# Patient Record
Sex: FEMALE | Race: OTHER | Employment: UNEMPLOYED | URBAN - METROPOLITAN AREA
[De-identification: names, ages, dates, MRNs, and addresses within clinical notes are randomized per-mention and may not be internally consistent; named-entity substitution may affect disease eponyms.]

---

## 2017-06-13 ENCOUNTER — OFFICE VISIT (OUTPATIENT)
Dept: OBGYN CLINIC | Age: 42
End: 2017-06-13

## 2017-06-13 ENCOUNTER — HOSPITAL ENCOUNTER (OUTPATIENT)
Dept: PERINATAL CARE | Age: 42
Discharge: HOME | End: 2017-06-13
Attending: OBSTETRICS & GYNECOLOGY
Payer: MEDICAID

## 2017-06-13 VITALS
SYSTOLIC BLOOD PRESSURE: 124 MMHG | WEIGHT: 170 LBS | HEIGHT: 65 IN | BODY MASS INDEX: 28.32 KG/M2 | DIASTOLIC BLOOD PRESSURE: 80 MMHG

## 2017-06-13 DIAGNOSIS — Z01.419 WELL FEMALE EXAM WITH ROUTINE GYNECOLOGICAL EXAM: ICD-10-CM

## 2017-06-13 DIAGNOSIS — N89.8 VAGINAL DISCHARGE DURING PREGNANCY IN FIRST TRIMESTER: ICD-10-CM

## 2017-06-13 DIAGNOSIS — Z32.01 PREGNANCY EXAMINATION OR TEST, POSITIVE RESULT: Primary | ICD-10-CM

## 2017-06-13 DIAGNOSIS — O26.891 VAGINAL DISCHARGE DURING PREGNANCY IN FIRST TRIMESTER: ICD-10-CM

## 2017-06-13 PROBLEM — Z34.00 SUPERVISION OF NORMAL FIRST PREGNANCY: Status: ACTIVE | Noted: 2017-06-13

## 2017-06-13 LAB
ANTIBODY SCREEN, EXTERNAL: NORMAL
CHLAMYDIA, EXTERNAL: NEGATIVE
CYSTIC FIBROSIS, EXTERNAL: NEGATIVE
HBSAG, EXTERNAL: NEGATIVE
HCT, EXTERNAL: 36.3
HGB, EXTERNAL: 12.2
HIV, EXTERNAL: NEGATIVE
N. GONORRHEA, EXTERNAL: NEGATIVE
PAP SMEAR, EXTERNAL: NORMAL
PLATELET CNT,   EXTERNAL: 363
RUBELLA, EXTERNAL: NORMAL
T. PALLIDUM, EXTERNAL: NEGATIVE
TYPE, ABO & RH, EXTERNAL: NORMAL
URINALYSIS, EXTERNAL: NEGATIVE

## 2017-06-13 PROCEDURE — 76801 OB US < 14 WKS SINGLE FETUS: CPT | Performed by: OBSTETRICS & GYNECOLOGY

## 2017-06-13 NOTE — PATIENT INSTRUCTIONS
Learning About Pregnancy  Your Care Instructions  Your health in the early weeks of your pregnancy is particularly important for your babys health. Take good care of yourself. Anything you do that harms your body can also harm your baby. Make sure to go to all of your doctor appointments. Regular checkups will help keep you and your baby healthy. Follow-up care is a key part of your treatment and safety. Be sure to make and go to all appointments, and call your doctor if you are having problems. Its also a good idea to know your test results and keep a list of the medicines you take. How can you care for yourself at home? Diet  · Eat a balanced diet. Make sure your diet includes plenty of beans, peas, and leafy green vegetables. · Do not skip meals or go for many hours without eating. If you are nauseated, try to eat a small, healthy snack every 2 to 3 hours. · Do not eat fish that has a high level of mercury, such as shark, swordfish, or mackerel. Do not eat more than one can of tuna each week. · Drink plenty of fluids, enough so that your urine is light yellow or clear like water. If you have kidney, heart, or liver disease and have to limit fluids, talk with your doctor before you increase the amount of fluids you drink. · Cut down on caffeine, such as coffee, tea, and cola. · Do not drink alcohol, such as beer, wine, or hard liquor. · Take a multivitamin that contains at least 400 micrograms (mcg) of folic acid to help prevent birth defects. Fortified cereal and whole wheat bread are good additional sources of folic acid. · Increase the calcium in your diet. Try to drink a quart of skim milk each day. You may also take calcium supplements and choose foods such as cheese and yogurt. Lifestyle  · Make sure you go to your follow-up appointments. · Get plenty of rest. You may be unusually tired while you are pregnant. · Get at least 30 minutes of exercise on most days of the week.  Walking is a good choice. If you have not exercised in the past, start out slowly. Take several short walks each day. · Do not smoke. If you need help quitting, talk to your doctor about stop-smoking programs. These can increase your chances of quitting for good. · Do not touch cat feces or litter boxes. Also, wash your hands after you handle raw meat, and fully cook all meat before you eat it. Wear gloves when you work in the yard or garden, and wash your hands well when you are done. Cat feces, raw or undercooked meat, and contaminated dirt can cause an infection that may harm your baby or lead to a miscarriage. · Do not use saunas or hot tubs. Raising your body temperature may harm your baby. · Avoid chemical fumes, paint fumes, or poisons. · Do not use illegal drugs or alcohol. Medicines  · Review all of your medicines with your doctor. Some of your routine medicines may need to be changed to protect your baby. · Use acetaminophen (Tylenol) to relieve minor problems, such as a mild headache or backache or a mild fever with cold symptoms. Do not use nonsteroidal anti-inflammatory drugs (NSAIDs), such as ibuprofen (Advil, Motrin) or naproxen (Aleve), unless your doctor says it is okay. · Do not take two or more pain medicines at the same time unless the doctor told you to. Many pain medicines have acetaminophen, which is Tylenol. Too much acetaminophen (Tylenol) can be harmful. · Take your medicines exactly as prescribed. Call your doctor if you think you are having a problem with your medicine. To manage morning sickness  · If you feel sick when you first wake up, try eating a small snack (such as crackers) before you get out of bed. Allow some time to digest the snack, and then get out of bed slowly. · Do not skip meals or go for long periods without eating. An empty stomach can make nausea worse. · Eat small, frequent meals instead of three large meals each day. · Drink plenty of fluids.  Sports drinks, such as Gatorade or Powerade, are good choices. · Eat foods that are high in protein but low in fat. · If you are taking iron supplements, ask your doctor if they are necessary. Iron can make nausea worse. · Avoid any smells, such as coffee, that make you feel sick. · Get lots of rest. Morning sickness may be worse when you are tired. Where can you learn more? Go to http://patti-juanito.info/. Enter P637 in the search box to learn more about \"Learning About Pregnancy. \"  Current as of: May 30, 2016  Content Version: 11.2  © 8240-6434 [employer], [employer]. Care instructions adapted under license by [employer] (which disclaims liability or warranty for this information). If you have questions about a medical condition or this instruction, always ask your healthcare professional. [address] any warranty or liability for your use of this information.

## 2017-06-13 NOTE — MR AVS SNAPSHOT
Visit Information Date & Time Provider Department Dept. Phone Encounter #  
 2017 10:10 AM Chris Mccarthy MD [facility] [phone] [id number] Upcoming Health Maintenance Date Due  
 PAP AKA CERVICAL CYTOLOGY 1995 INFLUENZA AGE 9 TO ADULT 2017 Allergies as of 2017  Review Complete On: 2017 By: Rosita Mccollumr No Known Allergies Current Immunizations  Never Reviewed No immunizations on file. Not reviewed this visit You Were Diagnosed With   
  
 Codes Comments Pregnancy examination or test, positive result    -  Primary ICD-10-CM: Z32.01 
ICD-9-CM: V72.42 Well female exam with routine gynecological exam     ICD-10-CM: S74.209 ICD-9-CM: V72.31 Vitals BP Height(growth percentile) Weight(growth percentile) LMP BMI OB Status 124/80 5' 5\" (1.651 m) 170 lb (77.1 kg) 2017 (Exact Date) 28.29 kg/m2 Pregnant Smoking Status Never Smoker BMI and BSA Data Body Mass Index Body Surface Area  
 28.29 kg/m 2 1.88 m 2 Your Updated Medication List  
  
Notice  As of 2017 10:46 AM  
 You have not been prescribed any medications. We Performed the Following ANTIBODY SCREEN [id number] CPT(R)] CBC W/O DIFF [21340 CPT(R)] CULTURE, URINE [id number] CPT(R)] CYSTIC FIBROSIS MUTATION 97 [[id number] Custom] HEMOGLOBIN FRACTIONATION [[id number] Custom] HEP B SURFACE AG [id number] CPT(R)] HIV 1/2 AG/AB, 4TH GENERATION,W RFLX CONFIRM [[id number] Custom] PAP IG, CT-NG TV HPV 16&18,45 ([id number], [id number]) [[id number] Custom] PARVOVIRUS B19 AB, IGG [40712 CPT(R)] PARVOVIRUS B19 AB, IGM [42414 CPT(R)] PLATELET [39248 CPT(R)] REFERRAL TO PERINATOLOGY [LBO51 Custom] Comments:  
 Requested date to be seen:  ASAP Estimated Date of Delivery: 17 Obstetric History   T0    TAB0  SAB2  E0  M0  L0 Name of Baby 1: Not recorded Date: Not recorded     GA: Not recorded     Delivery: Not recorded Apgar1: Not recorded     Apgar5: Not recorded Living:  
 
Name of Baby 2: Not recorded Date: Not recorded     GA: Not recorded     Delivery: Not recorded Apgar1: Not recorded     Apgar5: Not recorded Living:  
 
Name of Baby 3: Not recorded Date: Not recorded     GA: Not recorded     Delivery: Not recorded Apgar1: Not recorded     Apgar5: Not recorded Living:  
 
 
Clinical indication for services requested (r/o statements cannot be used): 
__ Abnormal Screening test                            __Oligohydramnios __ AMA                                                             __Polyhydramnios __Diabetes (overt or gestational)                    __Preeclampsia  
__Decreased fetal movement                         __Preterm Labor __Fibroid uterus                                              __PROM 
__Habitual abortions                                       __Screening for anomaly, no risk factors __Hypertension                                               __Twins or higher  
__Incompetent cervix                                      __Limited/no prenatal care 
__Obesity                                                        __Vaginal Bleeding 
__Abnormal US Finding                              __Family History __Maternal Medical Condition                     _X_Other AMA and abnormal ultrasound Primary Perinatology Service Requested (required, please check one): 
_X_Diagnostic Ultrasound Services (with consult, if indicated) __Medical Consultation (with ultrasound, if clinically indicated) __Medical consultation only (no ultrasound services requested) __Genetic counseling only (no ultrasound services requested) Additional Services requested (optional, please check any that apply): 
__Nuchal translucency screening (CRL, NT, FHR) __Fetal echo with doppler __Chorionic villous sampling: Blood Type __Cerclage placement __3-D US if appropriate for findings or history __Fetal well-being assessment (BPP with NST , without NST , with Doppler studies if indicated) __PTD screen (TVUS-FFN @ 22-24 wk) __Cervix insufficiency screen starting at  wks (biweekly cervical length by TVUS) __Gestational diabetes management __Amniocentesis: Blood type:  
__External version GYN 
__Other procedure, please specify:  
 RUBELLA AB, IGG [id number] CPT(R)] T PALLIDUM SCREEN W/REFLEX [[id number] Custom] TYPE, ABO & RH [16162 CPT(R)] Referral Information Referral ID Referred By Referred To  
  
 [id number] Glory Gómez [facility]   
   [address] [facility] [address] [facility], [address] [facility] Phone: [phone] Visits Status Start Date End Date 1 New Request 17 If your referral has a status of pending review or denied, additional information will be sent to support the outcome of this decision. Patient Instructions Learning About Pregnancy Your Care Instructions Your health in the early weeks of your pregnancy is particularly important for your babys health. Take good care of yourself. Anything you do that harms your body can also harm your baby. Make sure to go to all of your doctor appointments. Regular checkups will help keep you and your baby healthy. Follow-up care is a key part of your treatment and safety. Be sure to make and go to all appointments, and call your doctor if you are having problems. Its also a good idea to know your test results and keep a list of the medicines you take. How can you care for yourself at home? Diet · Eat a balanced diet. Make sure your diet includes plenty of beans, peas, and leafy green vegetables. · Do not skip meals or go for many hours without eating. If you are nauseated, try to eat a small, healthy snack every 2 to 3 hours.  
· Do not eat fish that has a high level of mercury, such as shark, swordfish, or mackerel. Do not eat more than one can of tuna each week. · Drink plenty of fluids, enough so that your urine is light yellow or clear like water. If you have kidney, heart, or liver disease and have to limit fluids, talk with your doctor before you increase the amount of fluids you drink. · Cut down on caffeine, such as coffee, tea, and cola. · Do not drink alcohol, such as beer, wine, or hard liquor. · Take a multivitamin that contains at least 400 micrograms (mcg) of folic acid to help prevent birth defects. Fortified cereal and whole wheat bread are good additional sources of folic acid. · Increase the calcium in your diet. Try to drink a quart of skim milk each day. You may also take calcium supplements and choose foods such as cheese and yogurt. Lifestyle · Make sure you go to your follow-up appointments. · Get plenty of rest. You may be unusually tired while you are pregnant. · Get at least 30 minutes of exercise on most days of the week. Walking is a good choice. If you have not exercised in the past, start out slowly. Take several short walks each day. · Do not smoke. If you need help quitting, talk to your doctor about stop-smoking programs. These can increase your chances of quitting for good. · Do not touch cat feces or litter boxes. Also, wash your hands after you handle raw meat, and fully cook all meat before you eat it. Wear gloves when you work in the yard or garden, and wash your hands well when you are done. Cat feces, raw or undercooked meat, and contaminated dirt can cause an infection that may harm your baby or lead to a miscarriage. · Do not use saunas or hot tubs. Raising your body temperature may harm your baby. · Avoid chemical fumes, paint fumes, or poisons. · Do not use illegal drugs or alcohol. Medicines · Review all of your medicines with your doctor. Some of your routine medicines may need to be changed to protect your baby. · Use acetaminophen (Tylenol) to relieve minor problems, such as a mild headache or backache or a mild fever with cold symptoms. Do not use nonsteroidal anti-inflammatory drugs (NSAIDs), such as ibuprofen (Advil, Motrin) or naproxen (Aleve), unless your doctor says it is okay. · Do not take two or more pain medicines at the same time unless the doctor told you to. Many pain medicines have acetaminophen, which is Tylenol. Too much acetaminophen (Tylenol) can be harmful. · Take your medicines exactly as prescribed. Call your doctor if you think you are having a problem with your medicine. To manage morning sickness · If you feel sick when you first wake up, try eating a small snack (such as crackers) before you get out of bed. Allow some time to digest the snack, and then get out of bed slowly. · Do not skip meals or go for long periods without eating. An empty stomach can make nausea worse. · Eat small, frequent meals instead of three large meals each day. · Drink plenty of fluids. Sports drinks, such as Gatorade or Powerade, are good choices. · Eat foods that are high in protein but low in fat. · If you are taking iron supplements, ask your doctor if they are necessary. Iron can make nausea worse. · Avoid any smells, such as coffee, that make you feel sick. · Get lots of rest. Morning sickness may be worse when you are tired. Where can you learn more? Go to http://patti-juanito.info/. Enter H701 in the search box to learn more about \"Learning About Pregnancy. \" Current as of: May 30, 2016 Content Version: 11.2 © 5588-6610 [employer]. Care instructions adapted under license by [employer] (which disclaims liability or warranty for this information). If you have questions about a medical condition or this instruction, always ask your healthcare professional. [address] any warranty or liability for your use of this information. Introducing [facility]! [facility] introduces [employer] patient portal. Now you can access parts of your medical record, email your doctor's office, and request medication refills online. 1. In your internet browser, go to [email or web address] 2. Click on the First Time User? Click Here link in the Sign In box. You will see the New Member Sign Up page. 3. Enter your [employer] Access Code exactly as it appears below. You will not need to use this code after youve completed the sign-up process. If you do not sign up before the expiration date, you must request a new code. · [employer] Access Code: [email or web address] Expires: 9/11/2017  9:32 AM 
 
4. Enter the last four digits of your Social Security Number (xxxx) and Date of Birth (mm/dd/yyyy) as indicated and click Submit. You will be taken to the next sign-up page. 5. Create a [employer] ID. This will be your [employer] login ID and cannot be changed, so think of one that is secure and easy to remember. 6. Create a [employer] password. You can change your password at any time. 7. Enter your Password Reset Question and Answer. This can be used at a later time if you forget your password. 8. Enter your e-mail address. You will receive e-mail notification when new information is available in [address] 9. Click Sign Up. You can now view and download portions of your medical record. 10. Click the Download Summary menu link to download a portable copy of your medical information. If you have questions, please visit the Frequently Asked Questions section of the [employer] website. Remember, [employer] is NOT to be used for urgent needs. For medical emergencies, dial 911. Now available from your iPhone and Android! Please provide this summary of care documentation to your next provider. If you have any questions after today's visit, please call [phone].

## 2017-06-13 NOTE — PROGRESS NOTES
Current pregnancy history:    Tim Gonzalez is a ,  36 y.o. female  Patient's last menstrual period was 2017 (exact date). .  She presents for the evaluation of amenorrhea and a positive pregnancy test.    LMP history:  The date of her LMP is certain. Her last menstrual period was normal and lasted for 4 to 5 days. A urine pregnancy test was positive a few weeks ago. She was not on the pill at conception. Based on her LMP, her EDC is 17 and her EGA is 11 weeks,4 days. Her menstrual cycles are regular and occur approximately every 28 days  and range from 3 to 5 days. The last menses lasted the usual number of days. Ultrasound data:  She had an  ultrasound done by the ultrasound tech today which revealed a viable hurst pregnancy with a gestational age of 9 weeks and 4 days giving an EDC of 18. Pregnancy symptoms:    Since her LMP she has experienced  urinary frequency, breast tenderness, and nausea. She has been vomiting over the last few weeks. Associated signs and symptoms which she denies: dysuria, discharge, vaginal bleeding. Relevant past pregnancy history:   She has the following pregnancy history: last pregnancy ended in miscarriage    Relevant past medical history:(relevant to this pregnancy): noncontributory. Substance history: negative for alcohol, tobacco and street drugs. Exposure history: There is/are no indoor cat/s in the home. She admits close contact with children on a regular basis. She has had chicken pox or the vaccine in the past.   Patient denies issues with domestic violence. Genetic Screening/Teratology Counseling: (Includes patient, baby's father, or anyone in either family with:)  3.  Patient's age >/= 28 at [facility]?-- no  .   2.   Thalassemia ([facility], [facility], [address], or  background): MCV<80?--no.     3.  Neural tube defect (meningomyelocele, spina bifida, anencephaly)?--no.   4.  Congenital heart defect?--no.  5.  Down syndrome?--no.   6.  Rocky-Sachs ([employer], [place])?--no.   7.  Canavan's Disease?--no.   8.  Familial Dysautonomia?--no.   9.  Sickle cell disease or trait ()? --no   The patient has not been tested for sickle trait  10. Hemophilia or other blood disorders?--no. 11.  Muscular dystrophy?--no. 12.  Cystic fibrosis?--no. 13.  Forrest's Chorea?--no. 14.  Mental retardation/autism (if yes was person tested for Fragile X)?--no. 15.  Other inherited genetic or chromosomal disorder?--no. 12.  Maternal metabolic disorder (DM, PKU, etc)?--no. 17.  Patient or FOB with a child with a birth defect not listed above?--no.  17a. Patient or FOB with a birth defect themselves?--no. 18.  Recurrent pregnancy loss, or stillbirth?--no. 19.  Any medications since LMP other than prenatal vitamins (include vitamins, supplements, OTC meds, drugs, alcohol)?--no. 20.  Any other genetic/environmental exposure to discuss?--no. Infection History:  1. Lives with someone with TB or TB exposed?--no.   2.  Patient or partner has history of genital herpes?--no.  3.  Rash or viral illness since LMP?--no.    4.  History of STD (GC, CT, HPV, syphilis, HIV)? --no   5. Other: OTHER? Past Medical History:   Diagnosis Date    Arrhythmia      No past surgical history on file. Social History     Occupational History    Not on file.      Social History Main Topics    Smoking status: Never Smoker    Smokeless tobacco: Not on file    Alcohol use No    Drug use: No    Sexual activity: Yes     Partners: Male     Birth control/ protection: None     Family History   Problem Relation Age of Onset    No Known Problems Mother      OB History    Para Term  AB SAB TAB Ectopic Multiple Living   3 0   2 2    0      # Outcome Date GA Lbr Dillon/2nd Weight Sex Delivery Anes PTL Lv   3 Current            2 SAB            1 SAB                 No Known Allergies  Prior to Admission medications    Not on File Review of Systems: History obtained from the patient  Constitutional: negative for weight loss, fever, night sweats  HEENT: negative for hearing loss, earache, congestion, snoring, sore throat  CV: negative for chest pain, palpitations, edema  Resp: negative for cough, shortness of breath, wheezing  Breast: negative for breast lumps, nipple discharge, galactorrhea  GI: negative for change in bowel habits, abdominal pain, black or bloody stools  : negative for frequency, dysuria, hematuria, vaginal discharge  MSK: negative for back pain, joint pain, muscle pain  Skin: negative for itching, rash, hives  Neuro: negative for dizziness, headache, confusion, weakness  Psych: negative for anxiety, depression, change in mood  Heme/lymph: negative for bleeding, bruising, pallor    Objective:  Visit Vitals    /80    Ht 5' 5\" (1.651 m)    Wt 170 lb (77.1 kg)    LMP 04/07/2017 (Exact Date)    BMI 28.29 kg/m2       Physical Exam:     Constitutional  · Appearance: well-nourished, well developed, alert, in no acute distress    HENT  · Head  · Face: appears normal  · Eyes: appear normal  · Ears: normal  · Mouth: normal  · Lips: no lesions    Neck  · Inspection/Palpation: normal appearance, no masses or tenderness  · Lymph Nodes: no lymphadenopathy present  · Thyroid: gland size normal, nontender, no nodules or masses present on palpation    Chest  · Respiratory Effort: breathing unlabored    Gastrointestinal  · Abdominal Examination: abdomen non-tender to palpation, normal bowel sounds, no masses present  · Liver and spleen: no hepatomegaly present, spleen not palpable  · Hernias: no hernias identified    Genitourinary  · External Genitalia: normal appearance for age, no discharge present, no tenderness present, no inflammatory lesions present, no masses present, no atrophy present  · Vagina: normal vaginal vault without central or paravaginal defects, no discharge present, no inflammatory lesions present, no masses present  · Bladder: non-tender to palpation  · Urethra: appears normal  · Cervix: normal   · Uterus: enlarged, normal shape, soft  · Adnexa: no adnexal tenderness present, no adnexal masses present  · Perineum: perineum within normal limits, no evidence of trauma, no rashes or skin lesions present  · Anus: anus within normal limits, no hemorrhoids present  · Inguinal Lymph Nodes: no lymphadenopathy present    Skin  · General Inspection: no rash, no lesions identified    Neurologic/Psychiatric  · Mental Status:  · Orientation: grossly oriented to person, place and time  · Mood and Affect: mood normal, affect appropriate    Assessment:   Intrauterine pregnancy with the following problems identified: AMA- saw [facility] today, NIPS today. Initial concern re: cranial structure sent to [facility] and they were not concerned at this time. Plan:     Offered CF testing, CVS, Nuchal Translucency, MSAFP, amnio, and discussed NIPT  Course of pregnancy discussed including visit schedule, routine U/S, glucola testing, etc.  Avoid alcoholic beverages and illicit/recreational drugs use  Take prenatal vitamins or folic acid daily. Hospital and practice style discussed with coverage system. Discussed nutrition, toxoplasmosis precautions, sexual activity, exercise, need for influenza vaccine, environmental and work hazards, travel advice, screen for domestic violence, need for seat belts. Discussed seafood, unpasteurized dairy products, deli meat, artificial sweeteners, and caffeine. Information on prenatal classes/breastfeeding given. Information on circumcision given  Patient encouraged not to smoke. Discussed current prescription drug use. Given medication list.  Discussed the use of over the counter medications and chemicals. Route of delivery discussed, including risks, benefits, and alternatives of  versus repeat LTCS.   Pt understands risk of hemorrhage during pregnancy and post delivery and would accept blood products if necessary in life-threatening emergencies    Handouts given to pt.

## 2017-06-14 LAB — BACTERIA UR CULT: NO GROWTH

## 2017-06-15 LAB
C TRACH RRNA CVX QL NAA+PROBE: NEGATIVE
CYTOLOGIST CVX/VAG CYTO: NORMAL
CYTOLOGY CVX/VAG DOC THIN PREP: NORMAL
DX ICD CODE: NORMAL
HPV I/H RISK 1 DNA CVX QL PROBE+SIG AMP: NEGATIVE
Lab: NORMAL
N GONORRHOEA RRNA CVX QL NAA+PROBE: NEGATIVE
OTHER STN SPEC: NORMAL
PATH REPORT.FINAL DX SPEC: NORMAL
STAT OF ADQ CVX/VAG CYTO-IMP: NORMAL
T VAGINALIS RRNA SPEC QL NAA+PROBE: NEGATIVE

## 2017-06-17 LAB
A VAGINAE DNA VAG QL NAA+PROBE: ABNORMAL SCORE
BVAB2 DNA VAG QL NAA+PROBE: ABNORMAL SCORE
C ALBICANS DNA VAG QL NAA+PROBE: NEGATIVE
C GLABRATA DNA VAG QL NAA+PROBE: NEGATIVE
MEGA1 DNA VAG QL NAA+PROBE: ABNORMAL SCORE

## 2017-06-20 ENCOUNTER — TELEPHONE (OUTPATIENT)
Dept: OBGYN CLINIC | Age: 42
End: 2017-06-20

## 2017-06-20 LAB
ABO GROUP BLD: NORMAL
B19V IGG SER IA-ACNC: 0.6 INDEX (ref 0–0.8)
B19V IGM SER IA-ACNC: 0.1 INDEX (ref 0–0.8)
BLD GP AB SCN SERPL QL: NORMAL
BLD GP AB SCN SERPL QL: POSITIVE
BLOOD GROUP ANTIBODIES SERPL: ABNORMAL
CFTR MUT ANL BLD/T: NORMAL
ERYTHROCYTE [DISTWIDTH] IN BLOOD BY AUTOMATED COUNT: 13.9 % (ref 12.3–15.4)
GENE DIS ANL CARRIER INTERP-IMP: NORMAL
HBV SURFACE AG SERPL QL IA: NEGATIVE
HCT VFR BLD AUTO: 36.3 % (ref 34–46.6)
HGB A MFR BLD: 97.3 % (ref 94–98)
HGB A2 MFR BLD COLUMN CHROM: 2.7 % (ref 0.7–3.1)
HGB BLD-MCNC: 12.2 G/DL (ref 11.1–15.9)
HGB C MFR BLD: 0 %
HGB F MFR BLD: 0 % (ref 0–2)
HGB FRACT BLD-IMP: NORMAL
HGB S BLD QL SOLY: NEGATIVE
HGB S MFR BLD: 0 %
HIV 1+2 AB+HIV1 P24 AG SERPL QL IA: NON REACTIVE
MCH RBC QN AUTO: 29.1 PG (ref 26.6–33)
MCHC RBC AUTO-ENTMCNC: 33.6 G/DL (ref 31.5–35.7)
MCV RBC AUTO: 87 FL (ref 79–97)
PLATELET # BLD AUTO: 326 X10E3/UL (ref 150–379)
RBC # BLD AUTO: 4.19 X10E6/UL (ref 3.77–5.28)
RH BLD: NEGATIVE
RUBV IGG SERPL IA-ACNC: 1.43 INDEX
T PALLIDUM AB SER QL IA: NEGATIVE
WBC # BLD AUTO: 13.4 X10E3/UL (ref 3.4–10.8)
XXX BLOOD GROUP AB TITR SERPL AHG: ABNORMAL {TITER}

## 2017-06-20 RX ORDER — METRONIDAZOLE 500 MG/1
500 TABLET ORAL 2 TIMES DAILY
Qty: 14 TAB | Refills: 0 | Status: SHIPPED | OUTPATIENT
Start: 2017-06-20 | End: 2017-06-27

## 2017-06-20 NOTE — PROGRESS NOTES
Nabor Ibarra calling for lab results for patient. HIPPA verified for all access and given lab results over phone. Eugenia Avilez aware of MD recommendations and rx for flagyl will be sent to pharmacy of choice. Rushford Chute did mention that at the beginning of the patient's pregnancy, she was seen in the [facility] ER and was given Aida Bernheim does have those records and will be bringing in those records at next office visit. Eugenia Avilez aware that I will send the message to the physician alerting her that the patient previously had   Coleen Bernheim verbalized understanding.

## 2017-06-20 NOTE — TELEPHONE ENCOUNTER
Result Notes   Notes Recorded by Herber Little MD on 6/20/2017 at 1:38 PM  add to prenatal labs, Positive ant D titre, please ask if she has received Rhogam during the pregnancy.  If not then repeat in 4 weeks. Notes Recorded by Herber Little MD on 6/19/2017 at 11:04 AM  Results abnormal, notify pt of results,  labs c/w bv can take flagyl 500 mg 1 po bid x 1 week, do not combine with alcohol, prescription pended but no pharmacy in the computer. Candace Bee

## 2017-06-20 NOTE — PROGRESS NOTES
Chris Pizarro calling for lab results for patient. HIPPA verified for all access and given lab results over phone. Parke Claude aware of MD recommendations and rx for flagyl will be sent to pharmacy of choice. Parke Claude did mention that at the beginning of the patient's pregnancy, she was seen in the [facility] ER and was given Christina Suazo does have those records and will be bringing in those records at next office visit. Parke Claude aware that I will send the message to the physician alerting her that the patient previously had   Christina Jury verbalized understanding.

## 2017-06-20 NOTE — TELEPHONE ENCOUNTER
Rosanne Rodriguez calling for lab results for patient. HIPPA verified for all access and given lab results over phone. Sree Strange aware of MD recommendations and rx for flagyl will be sent to pharmacy of choice. Sree Strange did mention that at the beginning of the patient's pregnancy, she was seen in the [facility] ER and was given Kolleen Peabody does have those records and will be bringing in those records at next office visit. Sree Strange aware that I will send the message to the physician alerting her that the patient previously had   Kolleen Peabody verbalized understanding.

## 2017-06-21 RX ORDER — METRONIDAZOLE 500 MG/1
500 TABLET ORAL 2 TIMES DAILY
Qty: 14 TAB | Refills: 0 | Status: SHIPPED | OUTPATIENT
Start: 2017-06-21 | End: 2017-06-28

## 2017-06-22 ENCOUNTER — TELEPHONE (OUTPATIENT)
Dept: OBGYN CLINIC | Age: 42
End: 2017-06-22

## 2017-06-22 NOTE — TELEPHONE ENCOUNTER
Patient aware that not enough cells to test and opted to wait until her next fob appointment with FW to get blood drawn. Patient aware that she will not be charged for the repeat blood draw.

## 2017-06-22 NOTE — TELEPHONE ENCOUNTER
nips  Received: Yesterday       MD Sofy Duque                     Please call Janet Salmeron was no result reported, we can probably repeat it in 2 weeks.  I wanted to see if the patient will be billed for both tests.        LMTCB-- wanting to notify pt that there was no result because they didn't have enough cells to run the lab- we can repeat it in 2 weeks per FW-- Fernanda said the pt would not be billed for both since this was their error and not ours

## 2017-06-26 ENCOUNTER — HOSPITAL ENCOUNTER (OUTPATIENT)
Dept: PERINATAL CARE | Age: 42
Discharge: HOME | End: 2017-06-26
Attending: OBSTETRICS & GYNECOLOGY
Payer: MEDICAID

## 2017-06-26 PROCEDURE — 76815 OB US LIMITED FETUS(S): CPT | Performed by: OBSTETRICS & GYNECOLOGY

## 2017-06-28 ENCOUNTER — LAB ONLY (OUTPATIENT)
Dept: OBGYN CLINIC | Age: 42
End: 2017-06-28

## 2017-06-28 DIAGNOSIS — Z13.79 GENETIC TESTING: Primary | ICD-10-CM

## 2017-07-10 ENCOUNTER — TELEPHONE (OUTPATIENT)
Dept: OBGYN CLINIC | Age: 42
End: 2017-07-10

## 2017-07-10 NOTE — TELEPHONE ENCOUNTER
Kailee Gustafson(verified on HIPPA) called wanting the results from the 6/23/17. Patient is aware that the MD is out of the office but would like for this nurse to send it to the work in MD for review. Please advise.

## 2017-07-10 NOTE — TELEPHONE ENCOUNTER
It appears to be low risk but there are still parts that are not conclusive. I would advise she contact [facility], to discuss the new results in more detail, and see if they recommend considering amnio. 15w3d   36 y.o.

## 2017-07-11 NOTE — TELEPHONE ENCOUNTER
Notified pt with work in MD recommendation. She verbalized understanding and asked for the sex of the baby(female) and was given the MFM #.

## 2017-07-11 NOTE — TELEPHONE ENCOUNTER
Patient calling back to confirm her results and confirm the gender of her baby. Patient given TP recommendations.

## 2017-07-13 ENCOUNTER — ROUTINE PRENATAL (OUTPATIENT)
Dept: OBGYN CLINIC | Age: 42
End: 2017-07-13

## 2017-07-13 VITALS — BODY MASS INDEX: 28.79 KG/M2 | SYSTOLIC BLOOD PRESSURE: 124 MMHG | DIASTOLIC BLOOD PRESSURE: 72 MMHG | WEIGHT: 173 LBS

## 2017-07-13 DIAGNOSIS — Z34.02 ENCOUNTER FOR SUPERVISION OF NORMAL FIRST PREGNANCY IN SECOND TRIMESTER: Primary | ICD-10-CM

## 2017-07-13 LAB — AFP, MATERNAL, EXTERNAL: NORMAL

## 2017-07-13 NOTE — PROGRESS NOTES
Pt doing well, see prenatal flowsheet. msafp today  Discussed results of NIPS including the \"no result\" for Henning's Syndrome. Has f/u schedule will PNC.     Needs early glucola due to [facility]

## 2017-07-19 LAB
AFP ADJ MOM SERPL: 1.11
AFP INTERP SERPL-IMP: NORMAL
AFP INTERP SERPL-IMP: NORMAL
AFP SERPL-MCNC: 28.3 NG/ML
AGE AT DELIVERY: 43.9 YEARS
COMMENT, 01827: NORMAL
GA METHOD: NORMAL
GA: 15 WEEKS
IDDM PATIENT QL: NO
Lab: NORMAL
MULTIPLE PREGNANCY: NO
NEURAL TUBE DEFECT RISK FETUS: 8647 %
RESULTS, 017004: NORMAL

## 2017-08-10 ENCOUNTER — ROUTINE PRENATAL (OUTPATIENT)
Dept: OBGYN CLINIC | Age: 42
End: 2017-08-10

## 2017-08-10 VITALS
DIASTOLIC BLOOD PRESSURE: 72 MMHG | BODY MASS INDEX: 29.66 KG/M2 | RESPIRATION RATE: 19 BRPM | SYSTOLIC BLOOD PRESSURE: 120 MMHG | WEIGHT: 178 LBS | HEIGHT: 65 IN

## 2017-08-10 DIAGNOSIS — Z34.02 ENCOUNTER FOR SUPERVISION OF NORMAL FIRST PREGNANCY IN SECOND TRIMESTER: ICD-10-CM

## 2017-08-10 DIAGNOSIS — Z3A.19 19 WEEKS GESTATION OF PREGNANCY: ICD-10-CM

## 2017-08-10 DIAGNOSIS — O09.522 AMA (ADVANCED MATERNAL AGE) MULTIGRAVIDA 35+, SECOND TRIMESTER: Primary | ICD-10-CM

## 2017-08-10 NOTE — PROGRESS NOTES
Pt doing well, see prenatal flowsheet. Early glucola today  [facility] appointment tomorrow.

## 2017-08-11 LAB — GLUCOSE 1H P 50 G GLC PO SERPL-MCNC: 122 MG/DL (ref 65–139)

## 2017-08-15 ENCOUNTER — TELEPHONE (OUTPATIENT)
Dept: OBGYN CLINIC | Age: 42
End: 2017-08-15

## 2017-08-15 NOTE — TELEPHONE ENCOUNTER
Patient calling stating that she would like a generic PNV called into her pharmacy. Patient aware that rx was sent.

## 2017-08-18 ENCOUNTER — HOSPITAL ENCOUNTER (OUTPATIENT)
Dept: PERINATAL CARE | Age: 42
Discharge: HOME | End: 2017-08-18
Attending: OBSTETRICS & GYNECOLOGY
Payer: MEDICAID

## 2017-08-18 PROCEDURE — 76811 OB US DETAILED SNGL FETUS: CPT | Performed by: OBSTETRICS & GYNECOLOGY

## 2017-09-05 ENCOUNTER — TELEPHONE (OUTPATIENT)
Dept: OBGYN CLINIC | Age: 42
End: 2017-09-05

## 2017-09-05 NOTE — TELEPHONE ENCOUNTER
Message left at 9:29am      36year old  23w4d pregnant patient last seen in the office on 8/10/17. Patient left message regarding having cold symptoms , congestion and a cough and that she was not feeling the baby move as much. And that she was feeling hot sometimes and cold sometimes. This left a detailed message for the patient to call the office and get further details.

## 2017-09-05 NOTE — TELEPHONE ENCOUNTER
Patient called back and described that she has head congestion and has not taken any medication. Patient advised of medications she can try ie, Robitussin( plain or DM) , Mucinex , Guaifensin, Sucrets , nasal saline, loratadine, and Claritin. Patient advised to increase po fluids       Patient reports not feeling the baby as much since she has the cold symptoms. Patient states she last felt the baby at 8 :00am today. Patient advised per work in MD to come to the office tomorrow to be seen. Patient placed on the schedule to be seen at 1:00pm on 9/6/17. Patient verbalized understanding. Patient advised if symptoms change to call the office back. Patient verbalized understanding. Anastasia NOONAN

## 2017-09-06 ENCOUNTER — ROUTINE PRENATAL (OUTPATIENT)
Dept: OBGYN CLINIC | Age: 42
End: 2017-09-06

## 2017-09-06 VITALS — WEIGHT: 180 LBS | BODY MASS INDEX: 29.95 KG/M2 | SYSTOLIC BLOOD PRESSURE: 118 MMHG | DIASTOLIC BLOOD PRESSURE: 72 MMHG

## 2017-09-06 DIAGNOSIS — Z34.02 ENCOUNTER FOR SUPERVISION OF NORMAL FIRST PREGNANCY IN SECOND TRIMESTER: Primary | ICD-10-CM

## 2017-09-06 RX ORDER — AZITHROMYCIN 250 MG/1
TABLET, FILM COATED ORAL
Qty: 6 TAB | Refills: 0 | Status: SHIPPED | OUTPATIENT
Start: 2017-09-06 | End: 2017-09-06 | Stop reason: SDUPTHER

## 2017-09-06 RX ORDER — AZITHROMYCIN 250 MG/1
TABLET, FILM COATED ORAL
Qty: 6 TAB | Refills: 0 | Status: SHIPPED | OUTPATIENT
Start: 2017-09-06

## 2017-09-06 NOTE — PROGRESS NOTES
Pt doing well, see prenatal flowsheet. She reports increased sinus issues/allergies and decreased fetal movement. Now feeling normal movement.   Will treat with a-roman for sinus infection

## 2017-09-06 NOTE — MR AVS SNAPSHOT
Visit Information Date & Time Provider Department Dept. Phone Encounter #  
 2017  1:00 PM Jackson PadillaLucho [phone] [id number] Your Appointments 2017  1:00 PM  
OB VISIT with Jackson Padilla MD  
Juan C Castillo ([address] [facility]) Appt Note: 36 year odl  21 w4d pregnant, patient reports less fetal movement, denies rom, vaginal bleeding or contractions. advised to been seen by Dr Kristel Krueger [address] [facility] [id number]  
[facility] 31 [address] Upcoming Health Maintenance Date Due INFLUENZA AGE 9 TO ADULT 2017 PAP AKA CERVICAL CYTOLOGY 2020 Allergies as of 2017  Review Complete On: 2017 By: Terra Harrison No Known Allergies Current Immunizations  Never Reviewed No immunizations on file. Not reviewed this visit Vitals BP Weight(growth percentile) LMP BMI OB Status Smoking Status 118/72 180 lb (81.6 kg) 2017 (Exact Date) 29.95 kg/m2 Pregnant Never Smoker BMI and BSA Data Body Mass Index Body Surface Area  
 29.95 kg/m 2 1.93 m 2 Preferred Pharmacy Pharmacy Name Phone [facility] [address] [place] [address] [phone] Your Updated Medication List  
  
   
This list is accurate as of: 17 12:58 PM.  Always use your most recent med list.  
  
  
  
  
 PNV Comb. No76-Iron,Carbonyl-FA 29 mg iron- 1 mg Tab Commonly known as:  PNV 29-1 Take 1 Tab by mouth daily. Any generic prenatal vitamin will do. Introducing \[facility]\"" & HEALTH SERVICES! Dear Refugio Mcitnosh: Thank you for requesting a [employer] account. Our records indicate that you already have an active [employer] account. You can access your account anytime at [email or web address] Did you know that you can access your hospital and ER discharge instructions at any time in [employer]? You can also review all of your test results from your hospital stay or ER visit. Additional Information If you have questions, please visit the Frequently Asked Questions section of the [employer] website at [email or web address] Remember, Patreon is NOT to be used for urgent needs. For medical emergencies, dial 911. Now available from your iPhone and Android! Please provide this summary of care documentation to your next provider. Your primary care clinician is listed as Nadeen Grove. If you have any questions after today's visit, please call [phone].

## 2017-09-15 ENCOUNTER — HOSPITAL ENCOUNTER (OUTPATIENT)
Dept: PERINATAL CARE | Age: 42
Discharge: HOME | End: 2017-09-15
Attending: OBSTETRICS & GYNECOLOGY
Payer: COMMERCIAL

## 2017-09-15 PROCEDURE — 76816 OB US FOLLOW-UP PER FETUS: CPT | Performed by: OBSTETRICS & GYNECOLOGY

## 2017-09-17 ENCOUNTER — HOSPITAL ENCOUNTER (OUTPATIENT)
Age: 42
Setting detail: OBSERVATION
Discharge: HOME | End: 2017-09-17
Attending: OBSTETRICS & GYNECOLOGY | Admitting: OBSTETRICS & GYNECOLOGY
Payer: COMMERCIAL

## 2017-09-17 VITALS
HEART RATE: 98 BPM | SYSTOLIC BLOOD PRESSURE: 121 MMHG | RESPIRATION RATE: 14 BRPM | BODY MASS INDEX: 29.99 KG/M2 | WEIGHT: 180 LBS | TEMPERATURE: 97.8 F | HEIGHT: 65 IN | DIASTOLIC BLOOD PRESSURE: 53 MMHG

## 2017-09-17 PROBLEM — Z34.90 PREGNANCY: Status: ACTIVE | Noted: 2017-09-17

## 2017-09-17 PROCEDURE — 75810000275 HC EMERGENCY DEPT VISIT NO LEVEL OF CARE

## 2017-09-17 PROCEDURE — 99283 EMERGENCY DEPT VISIT LOW MDM: CPT | Performed by: OBSTETRICS & GYNECOLOGY

## 2017-09-17 NOTE — IP AVS SNAPSHOT
[address] 
[phone] Patient: Edyta Crews MRN: [id number] :[id number] You are allergic to the following Allergen Reactions Bee Sting (Sting, Bee) Anaphylaxis Recent Documentation Height Weight BMI OB Status Smoking Status 1.651 m 81.6 kg 29.95 kg/m2 Pregnant Never Smoker Emergency Contacts Name Discharge Info Relation Home Work Mobile Isabel Gustafson  Other Relative [6] [phone] About your hospitalization You were admitted on:  N/A You last received care in the:  [facility] LABOR & DELIVERY You were discharged on:  2017 Unit phone number:  [phone] Why you were hospitalized Your primary diagnosis was:  Not on File Your diagnoses also included:  Pregnancy Providers Seen During Your Hospitalizations Provider Role Specialty Primary office phone Cherry Sung MD Attending Provider Obstetrics & Gynecology [phone] Your Primary Care Physician (PCP) Primary Care Physician Office Phone Office Fax Юлияma Josephine [phone] Follow-up Information Follow up With Details Comments Contact Info Cherry Sung MD   [address] [place] [address] [address] [place] [address] 
[phone] Your Appointments 2017  1:30 PM EDT  
OB VISIT with Cherry Sung MD  
[facility] ([facility]) [address] [place] [address] [address] [place] [address]  
[phone] Current Discharge Medication List  
  
ASK your doctor about these medications Dose & Instructions Dispensing Information Comments Morning Noon Evening Bedtime  
 azithromycin 250 mg tablet Commonly known as:  Mirna Croak Your last dose was: Your next dose is:    
   
   
 2 tabs po today then 1 tab po daily x 4 days Quantity:  6 Tab Refills:  0  
     
   
   
   
  
 PNV Comb. No76-Iron,Carbonyl-FA 29 mg iron- 1 mg Tab Commonly known as:  PNV 29-1 Your last dose was: Your next dose is:    
   
   
 Dose:  1 Tab Take 1 Tab by mouth daily. Any generic prenatal vitamin will do. Quantity:  90 Tab Refills:  4 Discharge Instructions Ramirez Miyamoto Contractions: Care Instructions Your Care Instructions Parker Vasquez contractions prepare your uterus for labor. Think of them as a \"warm-up\" exercise that your body does. You may begin to feel them between the 28th and 30th weeks of your pregnancy. But they start as early as the 20th week. Parker Vasquez contractions usually occur more often during the ninth month. They may go away when you are active and return when you rest. These contractions are like mild contractions of true labor, but they occur less often. (You feel fewer than 8 in an hour.) They don't cause your cervix to open. It may be hard for you to tell the difference between Ramirez Miyamoto contractions and true labor, especially in your first pregnancy. Follow-up care is a key part of your treatment and safety. Be sure to make and go to all appointments, and call your doctor if you are having problems. It's also a good idea to know your test results and keep a list of the medicines you take. How can you care for yourself at home? · Try a warm bath to help relieve muscle tension and reduce pain. · Change positions every 30 minutes. Take breaks if you must sit for a long time. Get up and walk around. · Drink plenty of water, enough so that your urine is light yellow or clear like water. · Taking short walks may help you feel better. Your doctor needs to check any contractions that are getting stronger or closer together. Where can you learn more? Go to http://patti-juanito.info/. Enter 376 [id number] [id number] in the search box to learn more about \"Harney Vasquez Contractions: Care Instructions. \" Current as of: March 16, 2017 Content Version: 11.3 © 0254-6300 [employer]. Care instructions adapted under license by [employer] (which disclaims liability or warranty for this information). If you have questions about a medical condition or this instruction, always ask your healthcare professional. [place] [address] any warranty or liability for your use of this information. Counting Your Baby's Kicks: Care Instructions Your Care Instructions Counting your baby's kicks is one way your doctor can tell that your baby is healthy. Most womenespecially in a first pregnancyfeel their baby move for the first time between 16 and 22 weeks. The movement may feel like flutters rather than kicks. Your baby may move more at certain times of the day. When you are active, you may notice less kicking than when you are resting. At your prenatal visits, your doctor will ask whether the baby is active. In your last trimester, your doctor may ask you to count the number of times you feel your baby move. Follow-up care is a key part of your treatment and safety. Be sure to make and go to all appointments, and call your doctor if you are having problems. It's also a good idea to know your test results and keep a list of the medicines you take. How do you count fetal kicks? · A common method of checking your baby's movement is to count the number of kicks or moves you feel in 1 hour. Ten movements (such as kicks, flutters, or rolls) in 1 hour are normal. Some doctors suggest that you count in the morning until you get to 10 movements. Then you can quit for that day and start again the next day. · Pick your baby's most active time of day to count. This may be any time from morning to evening. · If you do not feel 10 movements in an hour, your baby may be sleeping. Wait for the next hour and count again. When should you call for help? Call your doctor now or seek immediate medical care if: 
· You noticed that your baby has stopped moving or is moving much less than normal. 
Watch closely for changes in your health, and be sure to contact your doctor if you have any problems. Where can you learn more? Go to http://patti-juanito.info/. Enter B402 in the search box to learn more about \"Counting Your Baby's Kicks: Care Instructions. \" Current as of: 2017 Content Version: 11.3 © 1902-2574 [employer] Realm. Care instructions adapted under license by [employer] (which disclaims liability or warranty for this information). If you have questions about a medical condition or this instruction, always ask your healthcare professional. [address] any warranty or liability for your use of this information. Pregnancy Precautions: Care Instructions Your Care Instructions There is no sure way to prevent labor before your due date ( labor) or to prevent most other pregnancy problems. But there are things you can do to increase your chances of a healthy pregnancy. Go to your appointments, follow your doctor's advice, and take good care of yourself. Eat well, and exercise (if your doctor agrees). And make sure to drink plenty of water. Follow-up care is a key part of your treatment and safety. Be sure to make and go to all appointments, and call your doctor if you are having problems. It's also a good idea to know your test results and keep a list of the medicines you take. How can you care for yourself at home? · Make sure you go to your prenatal appointments. At each visit, your doctor will check your blood pressure. Your doctor will also check to see if you have protein in your urine. High blood pressure and protein in urine are signs of preeclampsia.  This condition can be dangerous for you and your baby. · Drink plenty of fluids, enough so that your urine is light yellow or clear like water. Dehydration can cause contractions. If you have kidney, heart, or liver disease and have to limit fluids, talk with your doctor before you increase the amount of fluids you drink. · Tell your doctor right away if you notice any symptoms of an infection, such as: ¨ Burning when you urinate. ¨ A foul-smelling discharge from your vagina. ¨ Vaginal itching. ¨ Unexplained fever. ¨ Unusual pain or soreness in your uterus or lower belly. · Eat a balanced diet. Include plenty of foods that are high in calcium and iron. ¨ Foods high in calcium include milk, cheese, yogurt, almonds, and broccoli. ¨ Foods high in iron include red meat, shellfish, poultry, eggs, beans, raisins, whole-grain bread, and leafy green vegetables. · Do not smoke. If you need help quitting, talk to your doctor about stop-smoking programs and medicines. These can increase your chances of quitting for good. · Do not drink alcohol or use illegal drugs. · Follow your doctor's directions about activity. Your doctor will let you know how much, if any, exercise you can do. · Ask your doctor if you can have sex. If you are at risk for early labor, your doctor may ask you to not have sex. · Take care to prevent falls. During pregnancy, your joints are loose, and your balance is off. Sports such as bicycling, skiing, or in-line skating can increase your risk of falling. And don't ride horses or motorcycles, dive, water ski, scuba dive, or parachute jump while you are pregnant. · Avoid getting very hot. Do not use saunas or hot tubs. Avoid staying out in the sun in hot weather for long periods. Take acetaminophen (Tylenol) to lower a high fever. · Do not take any over-the-counter or herbal medicines or supplements without talking to your doctor or pharmacist first. 
When should you call for help? Call 911 anytime you think you may need emergency care. For example, call if: 
· You passed out (lost consciousness). · You have severe vaginal bleeding. · You have severe pain in your belly or pelvis. · You have had fluid gushing or leaking from your vagina and you know or think the umbilical cord is bulging into your vagina. If this happens, immediately get down on your knees so your rear end (buttocks) is higher than your head. This will decrease the pressure on the cord until help arrives. Call your doctor now or seek immediate medical care if: 
· You have signs of preeclampsia, such as: 
¨ Sudden swelling of your face, hands, or feet. ¨ New vision problems (such as dimness or blurring). ¨ A severe headache. · You have any vaginal bleeding. · You have belly pain or cramping. · You have a fever. · You have had regular contractions (with or without pain) for an hour. This means that you have 8 or more within 1 hour or 4 or more in 20 minutes after you change your position and drink fluids. · You have a sudden release of fluid from your vagina. · You have low back pain or pelvic pressure that does not go away. · You notice that your baby has stopped moving or is moving much less than normal. 
Watch closely for changes in your health, and be sure to contact your doctor if you have any problems. Where can you learn more? Go to http://patti-juanito.info/. Enter [id number] in the search box to learn more about \"Pregnancy Precautions: Care Instructions. \" Current as of: March 16, 2017 Content Version: 11.3 © 6305-2380 Teachbase. Care instructions adapted under license by [employer] Pictures (which disclaims liability or warranty for this information). If you have questions about a medical condition or this instruction, always ask your healthcare professional. [address] any warranty or liability for your use of this information. Weeks 22 to 26 of Your Pregnancy: Care Instructions Your Care Instructions As you enter your 7th month of pregnancy at week 26, your baby's lungs are growing stronger and getting ready to breathe. You may notice that your baby responds to the sound of your or your partner's voice. You may also notice that your baby does less turning and twisting and more squirming or jerking. Jerking often means that your baby has the hiccups. Hiccups are perfectly normal and are only temporary. You may want to think about attending a childbirth preparation class. This is also a good time to start thinking about whether you want to have pain medicine during labor. Most pregnant women are tested for gestational diabetes between weeks 25 and 28. Gestational diabetes occurs when your blood sugar level gets too high when you're pregnant. The test is important, because you can have gestational diabetes and not know it. But the condition can cause problems for your baby. Follow-up care is a key part of your treatment and safety. Be sure to make and go to all appointments, and call your doctor if you are having problems. It's also a good idea to know your test results and keep a list of the medicines you take. How can you care for yourself at home? Ease discomfort from your baby's kicking · Change your position. Sometimes this will cause your baby to change position too. · Take a deep breath while you raise your arm over your head. Then breathe out while you drop your arm. Do Kegel exercises to prevent urine from leaking · You can do Kegel exercises while you stand or sit. ¨ Squeeze the same muscles you would use to stop your urine. Your belly and thighs should not move. ¨ Hold the squeeze for 3 seconds, and then relax for 3 seconds. ¨ Start with 3 seconds. Then add 1 second each week until you are able to squeeze for 10 seconds. ¨ Repeat the exercise 10 to 15 times for each session. Do three or more sessions each day. Ease or reduce swelling in your feet, ankles, hands, and fingers · If your fingers are puffy, take off your rings. · Do not eat high-salt foods, such as potato chips. · Prop up your feet on a stool or couch as much as possible. Sleep with pillows under your feet. · Do not stand for long periods of time or wear tight shoes. · Wear support stockings. Where can you learn more? Go to http://patti-juanito.info/. Enter G264 in the search box to learn more about \"Weeks 22 to 26 of Your Pregnancy: Care Instructions. \" Current as of: March 16, 2017 Content Version: 11.3 © 5917-8800 [employer]. Care instructions adapted under license by [employer] (which disclaims liability or warranty for this information). If you have questions about a medical condition or this instruction, always ask your healthcare professional. [place] [address] any warranty or liability for your use of this information. Discharge Orders None Introducing [facility]! Dear Clint Malcolm: Thank you for requesting a [facility] account. Our records indicate that you already have an active [facility] account. You can access your account anytime at [email or web address] Did you know that you can access your hospital and ER discharge instructions at any time in [facility]? You can also review all of your test results from your hospital stay or ER visit. Additional Information If you have questions, please visit the Frequently Asked Questions section of the [facility] website at [email or web address] Remember, [facility] is NOT to be used for urgent needs. For medical emergencies, dial 911. Now available from your iPhone and Android! General Information Please provide this summary of care documentation to your next provider.  
  
  
    
    
 Patient Signature: ____________________________________________________________ Date:  ____________________________________________________________  
  
Andreia Piety Provider Signature:  ____________________________________________________________ Date:  ____________________________________________________________

## 2017-09-18 NOTE — DISCHARGE INSTRUCTIONS
Evelia Holguinter Contractions: Care Instructions  Your Care Instructions  Highland Vasquez contractions prepare your uterus for labor. Think of them as a \"warm-up\" exercise that your body does. You may begin to feel them between the 28th and 30th weeks of your pregnancy. But they start as early as the 20th week. Highland Vasquez contractions usually occur more often during the ninth month. They may go away when you are active and return when you rest. These contractions are like mild contractions of true labor, but they occur less often. (You feel fewer than 8 in an hour.) They don't cause your cervix to open. It may be hard for you to tell the difference between Evelia Fester contractions and true labor, especially in your first pregnancy. Follow-up care is a key part of your treatment and safety. Be sure to make and go to all appointments, and call your doctor if you are having problems. It's also a good idea to know your test results and keep a list of the medicines you take. How can you care for yourself at home? · Try a warm bath to help relieve muscle tension and reduce pain. · Change positions every 30 minutes. Take breaks if you must sit for a long time. Get up and walk around. · Drink plenty of water, enough so that your urine is light yellow or clear like water. · Taking short walks may help you feel better. Your doctor needs to check any contractions that are getting stronger or closer together. Where can you learn more? Go to http://patti-juanito.info/. Enter [id number] in the search box to learn more about \"Marlon Vasquez Contractions: Care Instructions. \"  Current as of: March 16, 2017  Content Version: 11.3  © 5587-5996 [employer]. Care instructions adapted under license by [employer] (which disclaims liability or warranty for this information).  If you have questions about a medical condition or this instruction, always ask your healthcare professional. [employer] disclaims any warranty or liability for your use of this information. Counting Your Baby's Kicks: Care Instructions  Your Care Instructions  Counting your baby's kicks is one way your doctor can tell that your baby is healthy. Most women--especially in a first pregnancy--feel their baby move for the first time between 16 and 22 weeks. The movement may feel like flutters rather than kicks. Your baby may move more at certain times of the day. When you are active, you may notice less kicking than when you are resting. At your prenatal visits, your doctor will ask whether the baby is active. In your last trimester, your doctor may ask you to count the number of times you feel your baby move. Follow-up care is a key part of your treatment and safety. Be sure to make and go to all appointments, and call your doctor if you are having problems. It's also a good idea to know your test results and keep a list of the medicines you take. How do you count fetal kicks? · A common method of checking your baby's movement is to count the number of kicks or moves you feel in 1 hour. Ten movements (such as kicks, flutters, or rolls) in 1 hour are normal. Some doctors suggest that you count in the morning until you get to 10 movements. Then you can quit for that day and start again the next day. · Pick your baby's most active time of day to count. This may be any time from morning to evening. · If you do not feel 10 movements in an hour, your baby may be sleeping. Wait for the next hour and count again. When should you call for help? Call your doctor now or seek immediate medical care if:  · You noticed that your baby has stopped moving or is moving much less than normal.  Watch closely for changes in your health, and be sure to contact your doctor if you have any problems. Where can you learn more? Go to http://patti-juanito.info/.   Enter R945 in the search box to learn more about \"Counting Your Baby's Kicks: Care Instructions. \"  Current as of: 2017  Content Version: 11.3  © 6913-7284 [employer]. Care instructions adapted under license by [employer] (which disclaims liability or warranty for this information). If you have questions about a medical condition or this instruction, always ask your healthcare professional. [address] any warranty or liability for your use of this information. Pregnancy Precautions: Care Instructions  Your Care Instructions  There is no sure way to prevent labor before your due date ( labor) or to prevent most other pregnancy problems. But there are things you can do to increase your chances of a healthy pregnancy. Go to your appointments, follow your doctor's advice, and take good care of yourself. Eat well, and exercise (if your doctor agrees). And make sure to drink plenty of water. Follow-up care is a key part of your treatment and safety. Be sure to make and go to all appointments, and call your doctor if you are having problems. It's also a good idea to know your test results and keep a list of the medicines you take. How can you care for yourself at home? · Make sure you go to your prenatal appointments. At each visit, your doctor will check your blood pressure. Your doctor will also check to see if you have protein in your urine. High blood pressure and protein in urine are signs of preeclampsia. This condition can be dangerous for you and your baby. · Drink plenty of fluids, enough so that your urine is light yellow or clear like water. Dehydration can cause contractions. If you have kidney, heart, or liver disease and have to limit fluids, talk with your doctor before you increase the amount of fluids you drink. · Tell your doctor right away if you notice any symptoms of an infection, such as:  ¨ Burning when you urinate.   ¨ A foul-smelling discharge from your vagina. ¨ Vaginal itching. ¨ Unexplained fever. ¨ Unusual pain or soreness in your uterus or lower belly. · Eat a balanced diet. Include plenty of foods that are high in calcium and iron. ¨ Foods high in calcium include milk, cheese, yogurt, almonds, and broccoli. ¨ Foods high in iron include red meat, shellfish, poultry, eggs, beans, raisins, whole-grain bread, and leafy green vegetables. · Do not smoke. If you need help quitting, talk to your doctor about stop-smoking programs and medicines. These can increase your chances of quitting for good. · Do not drink alcohol or use illegal drugs. · Follow your doctor's directions about activity. Your doctor will let you know how much, if any, exercise you can do. · Ask your doctor if you can have sex. If you are at risk for early labor, your doctor may ask you to not have sex. · Take care to prevent falls. During pregnancy, your joints are loose, and your balance is off. Sports such as bicycling, skiing, or in-line skating can increase your risk of falling. And don't ride horses or motorcycles, dive, water ski, scuba dive, or parachute jump while you are pregnant. · Avoid getting very hot. Do not use saunas or hot tubs. Avoid staying out in the sun in hot weather for long periods. Take acetaminophen (Tylenol) to lower a high fever. · Do not take any over-the-counter or herbal medicines or supplements without talking to your doctor or pharmacist first.  When should you call for help? Call 911 anytime you think you may need emergency care. For example, call if:  · You passed out (lost consciousness). · You have severe vaginal bleeding. · You have severe pain in your belly or pelvis. · You have had fluid gushing or leaking from your vagina and you know or think the umbilical cord is bulging into your vagina. If this happens, immediately get down on your knees so your rear end (buttocks) is higher than your head.  This will decrease the pressure on the cord until help arrives. Call your doctor now or seek immediate medical care if:  · You have signs of preeclampsia, such as:  ¨ Sudden swelling of your face, hands, or feet. ¨ New vision problems (such as dimness or blurring). ¨ A severe headache. · You have any vaginal bleeding. · You have belly pain or cramping. · You have a fever. · You have had regular contractions (with or without pain) for an hour. This means that you have 8 or more within 1 hour or 4 or more in 20 minutes after you change your position and drink fluids. · You have a sudden release of fluid from your vagina. · You have low back pain or pelvic pressure that does not go away. · You notice that your baby has stopped moving or is moving much less than normal.  Watch closely for changes in your health, and be sure to contact your doctor if you have any problems. Where can you learn more? Go to http://patti-juanito.info/. Enter [id number] in the search box to learn more about \"Pregnancy Precautions: Care Instructions. \"  Current as of: March 16, 2017  Content Version: 11.3  © 0081-8419 [employer]. Care instructions adapted under license by [employer] (which disclaims liability or warranty for this information). If you have questions about a medical condition or this instruction, always ask your healthcare professional. [place] [address] any warranty or liability for your use of this information. Weeks 22 to 26 of Your Pregnancy: Care Instructions  Your Care Instructions    As you enter your 7th month of pregnancy at week 26, your baby's lungs are growing stronger and getting ready to breathe. You may notice that your baby responds to the sound of your or your partner's voice. You may also notice that your baby does less turning and twisting and more squirming or jerking. Jerking often means that your baby has the hiccups. Hiccups are perfectly normal and are only temporary.   You may want to think about attending a childbirth preparation class. This is also a good time to start thinking about whether you want to have pain medicine during labor. Most pregnant women are tested for gestational diabetes between weeks 25 and 28. Gestational diabetes occurs when your blood sugar level gets too high when you're pregnant. The test is important, because you can have gestational diabetes and not know it. But the condition can cause problems for your baby. Follow-up care is a key part of your treatment and safety. Be sure to make and go to all appointments, and call your doctor if you are having problems. It's also a good idea to know your test results and keep a list of the medicines you take. How can you care for yourself at home? Ease discomfort from your baby's kicking  · Change your position. Sometimes this will cause your baby to change position too. · Take a deep breath while you raise your arm over your head. Then breathe out while you drop your arm. Do Kegel exercises to prevent urine from leaking  · You can do Kegel exercises while you stand or sit. ¨ Squeeze the same muscles you would use to stop your urine. Your belly and thighs should not move. ¨ Hold the squeeze for 3 seconds, and then relax for 3 seconds. ¨ Start with 3 seconds. Then add 1 second each week until you are able to squeeze for 10 seconds. ¨ Repeat the exercise 10 to 15 times for each session. Do three or more sessions each day. Ease or reduce swelling in your feet, ankles, hands, and fingers  · If your fingers are puffy, take off your rings. · Do not eat high-salt foods, such as potato chips. · Prop up your feet on a stool or couch as much as possible. Sleep with pillows under your feet. · Do not stand for long periods of time or wear tight shoes. · Wear support stockings. Where can you learn more? Go to http://patti-juanito.info/.   Enter G264 in the search box to learn more about \"Weeks 22 to 26 of Your Pregnancy: Care Instructions. \"  Current as of: March 16, 2017  Content Version: 11.3  © 7042-8448 [employer]. Care instructions adapted under license by [employer] (which disclaims liability or warranty for this information). If you have questions about a medical condition or this instruction, always ask your healthcare professional. [place] [address] any warranty or liability for your use of this information.

## 2017-09-18 NOTE — PROGRESS NOTES
1935: Pt placed on monitor. Pt arrived to unit in wheelchair from ED. Pt c/o cramping that comes and goes since yesterday and scant brown mucousy discharge present when she wipes. Positive fetal movement. Pt denies LOF. Pt denies burning with urination and any urinary or vaginal discomfort. Pt admits to drinking half a bottle of water today. Pt educated on the need to stay hydrated. 1953: Dr. Kerry Kline at bedside. MD reviews pt's symptoms with pt, pt educated by MD on the need to stay hydrated. MD order for PO hydration, MD states pt to be discharged after she has consumed 2 liters of water by mouth. MD discusses with pt need to make OB appointment for this week. Pt verbalizes understanding and agrees with POC and discharge. MD reviews FHR and ctx tracing and states that pt can be removed from monitor at this time. 8210: Discharge instructions reviewed with pt and family member including pregnancy precautions 25 weeks gestation, fetal kick counts, mikayla asif contractions and when to call MD and 911. All questions and concerns answered. Pt states she feels better and the cramping has resolved, pt denies brown discharge while she has been here. 8735: Pt leaves unit ambulatory with family member.

## 2017-09-18 NOTE — H&P
History & Physical    Name: Shonna Napoles MRN: [id number]  SSN: [id number]    YOB: 1975  Age: 36 y.o. Sex: female        Subjective:     Estimated Date of Delivery: 17  OB History    Para Term  AB Living   3 0   2 0   SAB TAB Ectopic Molar Multiple Live Births   2           # Outcome Date GA Lbr Dillon/2nd Weight Sex Delivery Anes PTL Lv   3 Current            2 SAB            1 SAB                   Ms. Whit Smith is seen with pregnancy at 25w2d for small amount of brownish discharge when she wipes nothing on underwear. Patient is very anxious secondary to having 2 SAB. Patient states back always hurt and admits to only drinking a little water on a regular basis. Prenatal course was complicated by advanced maternal age. Please see prenatal records for details. Past Medical History:   Diagnosis Date    Arrhythmia     congenital heart murmur     History reviewed. No pertinent surgical history. Social History     Occupational History    Not on file. Social History Main Topics    Smoking status: Never Smoker    Smokeless tobacco: Never Used    Alcohol use No    Drug use: No    Sexual activity: Yes     Partners: Male     Birth control/ protection: None     Family History   Problem Relation Age of Onset    No Known Problems Mother        Allergies   Allergen Reactions    Bee Sting [Sting, Bee] Anaphylaxis     Prior to Admission medications    Medication Sig Start Date End Date Taking? Authorizing Provider   PNV Comb. No76-Iron,Carbonyl-FA (PNV 29-1) 29 mg iron- 1 mg tab Take 1 Tab by mouth daily.  Any generic prenatal vitamin will do. 8/15/17  Yes Teodoro Holm MD   azithromycin (ZITHROMAX) 250 mg tablet 2 tabs po today then 1 tab po daily x 4 days 17   Teodoro Holm MD        Review of Systems: Constitutional: negative  Respiratory: negative  Cardiovascular: negative  Gastrointestinal: negative  Genitourinary:negative  Hematologic/lymphatic: negative  Musculoskeletal:negative  Neurological: negative  Behavioral/Psych: negative  Endocrine: negative    Objective:     Vitals:  Vitals:    09/17/17 1938 09/17/17 1950   BP: 122/61    Pulse: (!) 110    Resp: 14    Temp: 98.1 °F (36.7 °C)    Weight:  81.6 kg (180 lb)   Height:  5' 5\" (1.651 m)        Physical Exam:  Patient without distress. Heart: Regular rate and rhythm  Lung: clear to auscultation throughout lung fields and normal respiratory effort  Abdomen: soft, nontender  Perineum: blood absent, amniotic fluid absent  Cervical Exam: deferred  Membranes:  Intact  Fetal Heart Rate: fhr 150s  Without decels without ctx appropriate for EGA    Prenatal Labs:   Lab Results   Component Value Date/Time    Rubella, External Immune 06/13/2017    HBsAg, External Negative 06/13/2017    HIV, External Negative 06/13/2017    Gonorrhea, External Negative 06/13/2017    Chlamydia, External Negative 06/13/2017    ABO,Rh O Negative 06/13/2017         Assessment/Plan:     Active Problems:    * No active hospital problems.  *       Plan:Patient given reassuring no evidence of bleeding noted Needs increase po hydration and rec maternity belt for compliant back pain    Signed By:  Nanette Corcoran MD     September 17, 2017

## 2017-09-22 ENCOUNTER — TELEPHONE (OUTPATIENT)
Dept: OBGYN CLINIC | Age: 42
End: 2017-09-22

## 2017-09-22 ENCOUNTER — ROUTINE PRENATAL (OUTPATIENT)
Dept: OBGYN CLINIC | Age: 42
End: 2017-09-22

## 2017-09-22 VITALS — SYSTOLIC BLOOD PRESSURE: 112 MMHG | WEIGHT: 184 LBS | BODY MASS INDEX: 30.62 KG/M2 | DIASTOLIC BLOOD PRESSURE: 70 MMHG

## 2017-09-22 DIAGNOSIS — Z34.02 ENCOUNTER FOR SUPERVISION OF NORMAL FIRST PREGNANCY IN SECOND TRIMESTER: ICD-10-CM

## 2017-09-22 DIAGNOSIS — N89.8 VAGINAL DISCHARGE DURING PREGNANCY, UNSPECIFIED TRIMESTER: Primary | ICD-10-CM

## 2017-09-22 DIAGNOSIS — O26.899 VAGINAL DISCHARGE DURING PREGNANCY, UNSPECIFIED TRIMESTER: Primary | ICD-10-CM

## 2017-09-22 RX ORDER — METRONIDAZOLE 500 MG/1
500 TABLET ORAL 2 TIMES DAILY
Qty: 14 TAB | Refills: 0 | Status: SHIPPED | OUTPATIENT
Start: 2017-09-22 | End: 2017-09-29

## 2017-09-22 NOTE — MR AVS SNAPSHOT
Visit Information Date & Time Provider Department Dept. Phone Encounter #  
 9/22/2017 11:00 AM MD Juan C Garcia [phone] [id number] Your Appointments 9/27/2017  1:30 PM  
OB VISIT with MD Juan C Garcia ([facility]) Appt Note: 3wk fob  with glucola   FW  
 [address] [facility] [address] [place] [address] [phone]  
[facility] [address] [place] [address] Upcoming Health Maintenance Date Due INFLUENZA AGE 9 TO ADULT 8/1/2017 PAP AKA CERVICAL CYTOLOGY 6/13/2020 Allergies as of 9/22/2017  Review Complete On: 9/22/2017 By: Christofer Toussaint Severity Noted Reaction Type Reactions Bee Sting [Sting, Bee] High 09/17/2017    Anaphylaxis Current Immunizations  Never Reviewed No immunizations on file. Not reviewed this visit You Were Diagnosed With   
  
 Codes Comments Vaginal discharge during pregnancy, unspecified trimester    -  Primary ICD-10-CM: O26.899, N89.8 ICD-9-CM: 646.83, 623.5 Vitals BP Weight(growth percentile) LMP BMI OB Status Smoking Status 112/70 184 lb (83.5 kg) 04/07/2017 (Exact Date) 30.62 kg/m2 Pregnant Never Smoker BMI and BSA Data Body Mass Index Body Surface Area  
 30.62 kg/m 2 1.96 m 2 Preferred Pharmacy Pharmacy Name Phone [facility] [address] [facility] [place] [facility] [phone] Your Updated Medication List  
  
   
This list is accurate as of: 9/22/17 11:25 AM.  Always use your most recent med list.  
  
  
  
  
 azithromycin 250 mg tablet Commonly known as:  Saskia Maudlin 2 tabs po today then 1 tab po daily x 4 days PNV Comb. No76-Iron,Carbonyl-FA 29 mg iron- 1 mg Tab Commonly known as:  PNV 29-1 Take 1 Tab by mouth daily. Any generic prenatal vitamin will do. We Performed the Following [address] [id number] Custom] Introducing [facility]! Dear Mac Elizalde: Thank you for requesting a [employer] account. Our records indicate that you already have an active [employer] account. You can access your account anytime at [email or web address]. [email or web address] Did you know that you can access your hospital and ER discharge instructions at any time in [employer]? You can also review all of your test results from your hospital stay or ER visit. Additional Information If you have questions, please visit the Frequently Asked Questions section of the [employer] website at [email or web address] Remember, [employer] is NOT to be used for urgent needs. For medical emergencies, dial 911. Now available from your iPhone and Android! Please provide this summary of care documentation to your next provider. Your primary care clinician is listed as Nivia Harper. If you have any questions after today's visit, please call [phone].

## 2017-09-22 NOTE — PROGRESS NOTES
Pt doing well, see prenatal flowsheet. Reports malodorous discharge- will send nuswab and treat for bv.

## 2017-09-22 NOTE — TELEPHONE ENCOUNTER
I sent both flagyl and PNV to the [facility]. Please call pharmacy to confrim their receipt.

## 2017-09-22 NOTE — TELEPHONE ENCOUNTER
Received incoming call from pt, states she saw Dr. Ruthie Acuña today and was told 2 Rx's would be sent to pharmacy however they were not there when she went to . Please advise.

## 2017-09-26 LAB
A VAGINAE DNA VAG QL NAA+PROBE: NORMAL SCORE
BVAB2 DNA VAG QL NAA+PROBE: NORMAL SCORE
C ALBICANS DNA VAG QL NAA+PROBE: NEGATIVE
C GLABRATA DNA VAG QL NAA+PROBE: NEGATIVE
C TRACH RRNA SPEC QL NAA+PROBE: NEGATIVE
MEGA1 DNA VAG QL NAA+PROBE: NORMAL SCORE
N GONORRHOEA RRNA SPEC QL NAA+PROBE: NEGATIVE
T VAGINALIS RRNA SPEC QL NAA+PROBE: NEGATIVE

## 2017-09-29 ENCOUNTER — TELEPHONE (OUTPATIENT)
Dept: OBGYN CLINIC | Age: 42
End: 2017-09-29

## 2017-09-29 NOTE — TELEPHONE ENCOUNTER
Call  Received at 12:56Pm      36year old  27wod pregnant patient last seen in the office on 17. Patient no show on  and is wondering if ok to have next follow up and glucola done at that visit. Patient currently has appointment for 10/12/17 at 2:50PM.    ? one hour or 3 hour GTT      Please advise          Patient advised MD out of the office and will return on Monday. Patient verbalized understanding.

## 2017-10-12 ENCOUNTER — ROUTINE PRENATAL (OUTPATIENT)
Dept: OBGYN CLINIC | Age: 42
End: 2017-10-12

## 2017-10-12 ENCOUNTER — LAB ONLY (OUTPATIENT)
Dept: OBGYN CLINIC | Age: 42
End: 2017-10-12

## 2017-10-12 DIAGNOSIS — Z34.03 ENCOUNTER FOR SUPERVISION OF NORMAL FIRST PREGNANCY IN THIRD TRIMESTER: Primary | ICD-10-CM

## 2017-10-12 DIAGNOSIS — Z41.8 NEED FOR ISOLATION: ICD-10-CM

## 2017-10-12 LAB — GTT, 1 HR, GLUCOLA, EXTERNAL: NORMAL

## 2017-10-12 NOTE — PROGRESS NOTES
Immunization/s administered 10/12/2017 by Cesar Marion LPN with patient's consent. Rhogam Lot [id number] exp 03/17/19 right gleut. Patient tolerated procedure well. No reactions noted. Injection given per MD order.

## 2017-10-13 ENCOUNTER — HOSPITAL ENCOUNTER (OUTPATIENT)
Dept: PERINATAL CARE | Age: 42
Discharge: HOME | End: 2017-10-13
Attending: OBSTETRICS & GYNECOLOGY
Payer: COMMERCIAL

## 2017-10-13 LAB
BLD GP AB SCN SERPL QL: NEGATIVE
ERYTHROCYTE [DISTWIDTH] IN BLOOD BY AUTOMATED COUNT: 15.1 % (ref 12.3–15.4)
GLUCOSE 1H P 50 G GLC PO SERPL-MCNC: 133 MG/DL (ref 65–129)
HCT VFR BLD AUTO: 33.3 % (ref 34–46.6)
HGB BLD-MCNC: 10.8 G/DL (ref 11.1–15.9)
MCH RBC QN AUTO: 28 PG (ref 26.6–33)
MCHC RBC AUTO-ENTMCNC: 32.4 G/DL (ref 31.5–35.7)
MCV RBC AUTO: 86 FL (ref 79–97)
PLATELET # BLD AUTO: 242 X10E3/UL (ref 150–379)
RBC # BLD AUTO: 3.86 X10E6/UL (ref 3.77–5.28)
WBC # BLD AUTO: 12.6 X10E3/UL (ref 3.4–10.8)

## 2017-10-13 PROCEDURE — 76816 OB US FOLLOW-UP PER FETUS: CPT | Performed by: OBSTETRICS & GYNECOLOGY

## 2017-11-13 ENCOUNTER — ROUTINE PRENATAL (OUTPATIENT)
Dept: OBGYN CLINIC | Age: 42
End: 2017-11-13

## 2017-11-13 VITALS — DIASTOLIC BLOOD PRESSURE: 78 MMHG | WEIGHT: 190 LBS | BODY MASS INDEX: 31.62 KG/M2 | SYSTOLIC BLOOD PRESSURE: 112 MMHG

## 2017-11-13 DIAGNOSIS — Z34.03 ENCOUNTER FOR SUPERVISION OF NORMAL FIRST PREGNANCY IN THIRD TRIMESTER: Primary | ICD-10-CM

## 2017-11-13 NOTE — PROGRESS NOTES
Pt doing well, see prenatal flowsheet.   Not seen since 26 weeks, Glucola 133  [facility] appointment tomorrow

## 2017-11-14 ENCOUNTER — HOSPITAL ENCOUNTER (OUTPATIENT)
Dept: PERINATAL CARE | Age: 42
Discharge: HOME | End: 2017-11-14
Attending: OBSTETRICS & GYNECOLOGY
Payer: COMMERCIAL

## 2017-11-14 PROCEDURE — 76816 OB US FOLLOW-UP PER FETUS: CPT | Performed by: OBSTETRICS & GYNECOLOGY

## 2017-11-27 ENCOUNTER — ROUTINE PRENATAL (OUTPATIENT)
Dept: OBGYN CLINIC | Age: 42
End: 2017-11-27

## 2017-11-27 VITALS
DIASTOLIC BLOOD PRESSURE: 78 MMHG | HEIGHT: 65 IN | WEIGHT: 192 LBS | RESPIRATION RATE: 16 BRPM | BODY MASS INDEX: 31.99 KG/M2 | SYSTOLIC BLOOD PRESSURE: 116 MMHG

## 2017-11-27 DIAGNOSIS — Z34.83 PRENATAL CARE, SUBSEQUENT PREGNANCY IN THIRD TRIMESTER: Primary | ICD-10-CM

## 2017-11-27 LAB — GRBS, EXTERNAL: NEGATIVE

## 2017-11-27 NOTE — MR AVS SNAPSHOT
Visit Information Date & Time Provider Department Dept. Phone Encounter #  
 11/27/2017  2:00 PM Génesis Mckenzie MD [place] [facility] [phone] [id number] Upcoming Health Maintenance Date Due Influenza Age 5 to Adult 8/1/2017 OB 3RD TRIMESTER TDAP 9/29/2017 PAP AKA CERVICAL CYTOLOGY 6/13/2020 Allergies as of 11/27/2017  Review Complete On: 11/27/2017 By: Mitch Osler, RN Severity Noted Reaction Type Reactions Bee Sting [Sting, Bee] High 09/17/2017    Anaphylaxis Current Immunizations  Never Reviewed Name Date Rho(D) Immune Globulin - IM 10/12/2017 Not reviewed this visit You Were Diagnosed With   
  
 Codes Comments Prenatal care, subsequent pregnancy in third trimester    -  Primary ICD-10-CM: Z34.83 ICD-9-CM: V22.1 Vitals BP Resp Height(growth percentile) Weight(growth percentile) LMP BMI  
 116/78 (BP 1 Location: Right arm, BP Patient Position: Sitting) 16 5' 5\" (1.651 m) 192 lb (87.1 kg) 04/07/2017 (Exact Date) 31.95 kg/m2 OB Status Smoking Status Pregnant Never Smoker BMI and BSA Data Body Mass Index Body Surface Area 31.95 kg/m 2 2 m 2 Preferred Pharmacy Pharmacy Name Phone [facility] [address] 9 [phone] Your Updated Medication List  
  
   
This list is accurate as of: 11/27/17  2:20 PM.  Always use your most recent med list.  
  
  
  
  
 azithromycin 250 mg tablet Commonly known as:  Bennetta Plum 2 tabs po today then 1 tab po daily x 4 days PNV Comb. No76-Iron,Carbonyl-FA 29 mg iron- 1 mg Tab Commonly known as:  PNV 29-1 Take 1 Tab by mouth daily. Any generic prenatal vitamin will do.  
  
 prenatal vit-iron fumarate-fa 27 mg iron- 0.8 mg Tab tablet Take 1 Tab by mouth daily. We Performed the Following GROUP B STREP, CHRISTINE + REFLEX [[id number] Custom] To-Do List   
 2017 1:30 PM  
  Appointment with ULTRASOUND 3 SFM at [facility] ([phone]) Patient Instructions Weeks 34 to 36 of Your Pregnancy: Care Instructions Your Care Instructions By now, your baby and your belly have grown quite large. It is almost time to give birth. A full-term pregnancy can deliver between 37 and 42 weeks. Your baby's lungs are almost ready to breathe air. The bones in your baby's head are now firm enough to protect it, but soft enough to move down through the birth canal. 
You may feel excited, happy, anxious, or scared. You may wonder how you will know if you are in labor or what to expect during labor. Try to be flexible in your expectations of the birth. Because each birth is different, there is no way to know exactly what childbirth will be like for you. This care sheet will help you know what to expect and how to prepare. This may make your childbirth easier. If you haven't already had the Tdap shot during this pregnancy, talk to your doctor about getting it. It will help protect your  against pertussis infection. In the 36th week, most women have a test for group B streptococcus (GBS). GBS is a common bacteria that can live in the vagina and rectum. It can make your baby sick after birth. If you test positive, you will get antibiotics during labor. The medicine will keep your baby from getting the bacteria. Follow-up care is a key part of your treatment and safety. Be sure to make and go to all appointments, and call your doctor if you are having problems. It's also a good idea to know your test results and keep a list of the medicines you take. How can you care for yourself at home? Learn about pain relief choices · Pain is different for every woman. Talk with your doctor about your feelings about pain. · You can choose from several types of pain relief.  These include medicine or breathing techniques, as well as comfort measures. You can use more than one option. · If you choose to have pain medicine during labor, talk to your doctor about your options. Some medicines lower anxiety and help with some of the pain. Others make your lower body numb so that you won't feel pain. · Be sure to tell your doctor about your pain medicine choice before you start labor or very early in your labor. You may be able to change your mind as labor progresses. · Rarely, a woman is put to sleep by medicine given through a mask or an IV. Labor and delivery · The first stage of labor has three parts: early, active, and transition. ¨ Most women have early labor at home. You can stay busy or rest, eat light snacks, drink clear fluids, and start counting contractions. ¨ When talking during a contraction gets hard, you may be moving to active labor. During active labor, you should head for the hospital if you are not there already. ¨ You are in active labor when contractions come every 3 to 4 minutes and last about 60 seconds. Your cervix is opening more rapidly. ¨ If your water breaks, contractions will come faster and stronger. ¨ During transition, your cervix is stretching, and contractions are coming more rapidly. ¨ You may want to push, but your cervix might not be ready. Your doctor will tell you when to push. · The second stage starts when your cervix is completely opened and you are ready to push. ¨ Contractions are very strong to push the baby down the birth canal. 
¨ You will feel the urge to push. You may feel like you need to have a bowel movement. ¨ You may be coached to push with contractions. These contractions will be very strong, but you will not have them as often. You can get a little rest between contractions. ¨ You may be emotional and irritable. You may not be aware of what is going on around you. ¨ One last push, and your baby is born. · The third stage is when a few more contractions push out the placenta. This may take 30 minutes or less. · The fourth stage is the welcome recovery. You may feel overwhelmed with emotions and exhausted but alert. This is a good time to start breastfeeding. Where can you learn more? Go to http://patti-juanito.info/. Enter O617 in the search box to learn more about \"Weeks 34 to 36 of Your Pregnancy: Care Instructions. \" Current as of: March 16, 2017 Content Version: 11.4 © 7410-8240 Odojo. Care instructions adapted under license by [employer] Dive (which disclaims liability or warranty for this information). If you have questions about a medical condition or this instruction, always ask your healthcare professional. [address] any warranty or liability for your use of this information. Introducing [facility] & HEALTH SERVICES! Dear Penny Edwards: Thank you for requesting a BlueKai account. Our records indicate that you already have an active BlueKai account. You can access your account anytime at [email or web address] Application [email or web address] Did you know that you can access your hospital and ER discharge instructions at any time in BlueKai? You can also review all of your test results from your hospital stay or ER visit. Additional Information If you have questions, please visit the Frequently Asked Questions section of the BlueKai website at [email or web address] Remember, BlueKai is NOT to be used for urgent needs. For medical emergencies, dial 911. Now available from your iPhone and Android! Please provide this summary of care documentation to your next provider. Your primary care clinician is listed as Kwame Fraga. If you have any questions after today's visit, please call [phone].

## 2017-11-27 NOTE — PROGRESS NOTES
Pt doing well, see prenatal flowsheet.   gbs today  [facility] appointment tomorrow

## 2017-11-27 NOTE — PATIENT INSTRUCTIONS

## 2017-11-28 ENCOUNTER — HOSPITAL ENCOUNTER (OUTPATIENT)
Dept: PERINATAL CARE | Age: 42
Discharge: HOME | End: 2017-11-28
Attending: OBSTETRICS & GYNECOLOGY
Payer: COMMERCIAL

## 2017-11-28 PROCEDURE — 76818 FETAL BIOPHYS PROFILE W/NST: CPT | Performed by: OBSTETRICS & GYNECOLOGY

## 2017-11-29 LAB — GP B STREP DNA SPEC QL NAA+PROBE: NEGATIVE

## 2017-12-04 ENCOUNTER — ROUTINE PRENATAL (OUTPATIENT)
Dept: OBGYN CLINIC | Age: 42
End: 2017-12-04

## 2017-12-04 VITALS — WEIGHT: 194 LBS | SYSTOLIC BLOOD PRESSURE: 122 MMHG | BODY MASS INDEX: 32.28 KG/M2 | DIASTOLIC BLOOD PRESSURE: 82 MMHG

## 2017-12-04 DIAGNOSIS — Z34.03 ENCOUNTER FOR SUPERVISION OF NORMAL FIRST PREGNANCY IN THIRD TRIMESTER: Primary | ICD-10-CM

## 2017-12-04 NOTE — PROGRESS NOTES
Pt doing well, see prenatal flowsheet. Baby still breech, discussed version vs planned c/s. Planned c/s scheduled for 12/22/17 at 0930.

## 2017-12-04 NOTE — PATIENT INSTRUCTIONS
Pelvic Exam: Care Instructions  Your Care Instructions    When your doctor examines all of your pelvic organs, it's called a pelvic exam. Two good reasons to have this kind of exam are to check for sexually transmitted infections (STIs) and to get a Pap test. A Pap test is also called a Pap smear. It checks for early changes that can lead to cancer of the cervix. Sometimes a pelvic exam is part of a regular checkup. In this case, you can do some things to make your test results as accurate as possible. · Try to schedule the exam when you don't have your period. · Don't use douches, tampons, or vaginal medicines, sprays, or powders for 24 hours before your exam.  · Don't have sex for 24 hours before your exam.  Other times, women have this kind of exam at any time of the month. This is because they have pelvic pain, bleeding, or discharge. Or they may have another pelvic problem. Before your exam, it's important to share some information with your doctor. For example, if you are a survivor of rape or sexual abuse, you can talk about any concerns you may have. Your doctor will also want to know if you are pregnant or use birth control. And he or she will want to hear about any problems, surgeries, or procedures you have had in your pelvic area. You will also need to tell your doctor when your last period was. Follow-up care is a key part of your treatment and safety. Be sure to make and go to all appointments, and call your doctor if you are having problems. It's also a good idea to know your test results and keep a list of the medicines you take. How is a pelvic exam done? · During a pelvic exam, you will:  ¨ Take off your clothes below the waist. You will get a paper or cloth cover to put over the lower half of your body. Kip Chris on your back on an exam table. Your feet will be raised above you. Stirrups will support your feet. · The doctor will:  Ramiro Bread you to relax your knees.  Your knees need to lean out, toward the walls. ¨ Check the opening of your vagina for sores or swelling. ¨ Gently put a tool called a speculum into your vagina. It opens the vagina a little bit. You will feel some pressure. But if you are relaxed, it will not hurt. It lets your doctor see inside the vagina. ¨ Use a small brush, spatula, or swab to get a sample of cells, if you are having a Pap test or culture. The doctor then removes the speculum. ¨ Put on gloves and put one or two fingers of one hand into your vagina. The other hand goes on your lower belly. This lets your doctor feel your pelvic organs. You will probably feel some pressure. Try to stay relaxed. ¨ Put one gloved finger into your rectum and one into your vagina, if needed. This can also help check your pelvic organs. This exam takes about 10 minutes. At the end, you will get a washcloth or tissue to clean your vaginal area. It's normal to have some discharge after this exam. You can then get dressed. Some test results may be ready right away. But results from a culture or a Pap test may take several days or a few weeks. Why should you have a pelvic exam?  · You want to have recommended screening tests. This includes a Pap test.  · You think you have a vaginal infection. Signs include itching, burning, or unusual discharge. · You might have been exposed to a sexually transmitted infection (STI), such as chlamydia or herpes. · You have vaginal bleeding that is not part of your normal menstrual period. · You have pain in your belly or pelvis. · You have been sexually assaulted. A pelvic exam lets your doctor collect evidence and check for STIs. · You are pregnant. · You are having trouble getting pregnant. What are the risks of a pelvic exam?  There are no risks from a pelvic exam.  When should you call for help? Watch closely for changes in your health, and be sure to contact your doctor if you have any problems. Where can you learn more?   Go to http://patti-juanito.info/. Enter A774 in the search box to learn more about \"Pelvic Exam: Care Instructions. \"  Current as of: October 13, 2016  Content Version: 11.4  © 0791-7256 Healthwise, NexImmune. Care instructions adapted under license by Nano Terra (which disclaims liability or warranty for this information). If you have questions about a medical condition or this instruction, always ask your healthcare professional. [place] [address] any warranty or liability for your use of this information.

## 2017-12-04 NOTE — MR AVS SNAPSHOT
Visit Information Date & Time Provider Department Dept. Phone Encounter #  
 12/4/2017  1:00 PM Faustine Cogan, MD [place] [facility] [phone] [id number] Upcoming Health Maintenance Date Due Influenza Age 5 to Adult 8/1/2017 OB 3RD TRIMESTER TDAP 9/29/2017 PAP AKA CERVICAL CYTOLOGY 6/13/2020 Allergies as of 12/4/2017  Review Complete On: 12/4/2017 By: Tona Nielson RN Severity Noted Reaction Type Reactions Bee Sting [Sting, Bee] High 09/17/2017    Anaphylaxis Current Immunizations  Never Reviewed Name Date Rho(D) Immune Globulin - IM 10/12/2017 Not reviewed this visit Vitals BP Weight(growth percentile) LMP BMI OB Status Smoking Status 122/82 194 lb (88 kg) 04/07/2017 (Exact Date) 32.28 kg/m2 Pregnant Never Smoker BMI and BSA Data Body Mass Index Body Surface Area  
 32.28 kg/m 2 2.01 m 2 Preferred Pharmacy Pharmacy Name Phone [facility] [address] [place] [address] [phone] Your Updated Medication List  
  
   
This list is accurate as of: 12/4/17  1:08 PM.  Always use your most recent med list.  
  
  
  
  
 azithromycin 250 mg tablet Commonly known as:  Hedwig Pine 2 tabs po today then 1 tab po daily x 4 days PNV Comb. No76-Iron,Carbonyl-FA 29 mg iron- 1 mg Tab Commonly known as:  PNV 29-1 Take 1 Tab by mouth daily. Any generic prenatal vitamin will do.  
  
 prenatal vit-iron fumarate-fa 27 mg iron- 0.8 mg Tab tablet Take 1 Tab by mouth daily. Patient Instructions Pelvic Exam: Care Instructions Your Care Instructions When your doctor examines all of your pelvic organs, it's called a pelvic exam. Two good reasons to have this kind of exam are to check for sexually transmitted infections (STIs) and to get a Pap test. A Pap test is also called a Pap smear. It checks for early changes that can lead to cancer of the cervix. Sometimes a pelvic exam is part of a regular checkup. In this case, you can do some things to make your test results as accurate as possible. · Try to schedule the exam when you don't have your period. · Don't use douches, tampons, or vaginal medicines, sprays, or powders for 24 hours before your exam. 
· Don't have sex for 24 hours before your exam. 
Other times, women have this kind of exam at any time of the month. This is because they have pelvic pain, bleeding, or discharge. Or they may have another pelvic problem. Before your exam, it's important to share some information with your doctor. For example, if you are a survivor of rape or sexual abuse, you can talk about any concerns you may have. Your doctor will also want to know if you are pregnant or use birth control. And he or she will want to hear about any problems, surgeries, or procedures you have had in your pelvic area. You will also need to tell your doctor when your last period was. Follow-up care is a key part of your treatment and safety. Be sure to make and go to all appointments, and call your doctor if you are having problems. It's also a good idea to know your test results and keep a list of the medicines you take. How is a pelvic exam done? · During a pelvic exam, you will: ¨ Take off your clothes below the waist. You will get a paper or cloth cover to put over the lower half of your body. Garsita Shaver on your back on an exam table. Your feet will be raised above you. Stirrups will support your feet. · The doctor will: ¨ Ask you to relax your knees. Your knees need to lean out, toward the walls. ¨ Check the opening of your vagina for sores or swelling. ¨ Gently put a tool called a speculum into your vagina. It opens the vagina a little bit. You will feel some pressure. But if you are relaxed, it will not hurt. It lets your doctor see inside the vagina.  
¨ Use a small brush, spatula, or swab to get a sample of cells, if you are having a Pap test or culture. The doctor then removes the speculum. ¨ Put on gloves and put one or two fingers of one hand into your vagina. The other hand goes on your lower belly. This lets your doctor feel your pelvic organs. You will probably feel some pressure. Try to stay relaxed. ¨ Put one gloved finger into your rectum and one into your vagina, if needed. This can also help check your pelvic organs. This exam takes about 10 minutes. At the end, you will get a washcloth or tissue to clean your vaginal area. It's normal to have some discharge after this exam. You can then get dressed. Some test results may be ready right away. But results from a culture or a Pap test may take several days or a few weeks. Why should you have a pelvic exam? 
· You want to have recommended screening tests. This includes a Pap test. 
· You think you have a vaginal infection. Signs include itching, burning, or unusual discharge. · You might have been exposed to a sexually transmitted infection (STI), such as chlamydia or herpes. · You have vaginal bleeding that is not part of your normal menstrual period. · You have pain in your belly or pelvis. · You have been sexually assaulted. A pelvic exam lets your doctor collect evidence and check for STIs. · You are pregnant. · You are having trouble getting pregnant. What are the risks of a pelvic exam? 
There are no risks from a pelvic exam. 
When should you call for help? Watch closely for changes in your health, and be sure to contact your doctor if you have any problems. Where can you learn more? Go to http://patti-juanito.info/. Enter M012 in the search box to learn more about \"Pelvic Exam: Care Instructions. \" Current as of: October 13, 2016 Content Version: 11.4 © 2416-7179 Banki.ru.  Care instructions adapted under license by [employer] (which disclaims liability or warranty for this information). If you have questions about a medical condition or this instruction, always ask your healthcare professional. [address] any warranty or liability for your use of this information. Introducing [facility] & HEALTH SERVICES! Dear Dahiana Smith: Thank you for requesting a Musicplayr account. Our records indicate that you already have an active Musicplayr account. You can access your account anytime at [email or web address] [email or web address]. [employer]/Kelso Technologies Did you know that you can access your hospital and ER discharge instructions at any time in Musicplayr? You can also review all of your test results from your hospital stay or ER visit. Additional Information If you have questions, please visit the Frequently Asked Questions section of the Musicplayr website at [email or web address] Remember, Musicplayr is NOT to be used for urgent needs. For medical emergencies, dial 911. Now available from your iPhone and Android! Please provide this summary of care documentation to your next provider. Your primary care clinician is listed as Lorraine Herrera. If you have any questions after today's visit, please call [phone].

## 2017-12-12 ENCOUNTER — HOSPITAL ENCOUNTER (OUTPATIENT)
Dept: PERINATAL CARE | Age: 42
Discharge: HOME | End: 2017-12-12
Attending: OBSTETRICS & GYNECOLOGY
Payer: COMMERCIAL

## 2017-12-12 ENCOUNTER — ROUTINE PRENATAL (OUTPATIENT)
Dept: OBGYN CLINIC | Age: 42
End: 2017-12-12

## 2017-12-12 VITALS — BODY MASS INDEX: 33.28 KG/M2 | DIASTOLIC BLOOD PRESSURE: 72 MMHG | SYSTOLIC BLOOD PRESSURE: 126 MMHG | WEIGHT: 200 LBS

## 2017-12-12 DIAGNOSIS — Z34.03 ENCOUNTER FOR SUPERVISION OF NORMAL FIRST PREGNANCY IN THIRD TRIMESTER: Primary | ICD-10-CM

## 2017-12-12 PROCEDURE — 76816 OB US FOLLOW-UP PER FETUS: CPT | Performed by: OBSTETRICS & GYNECOLOGY

## 2017-12-12 NOTE — MR AVS SNAPSHOT
Visit Information Date & Time Provider Department Dept. Phone Encounter #  
 12/12/2017  1:10 PM MD Juan C Mckee [phone] [id number] Your Appointments 12/22/2017  9:30 AM  
PROCED[facility] with [facility] C [facility] Appt Note: Primary C/S  
 [address] [facility] [address]  
[facility] [address] Upcoming Health Maintenance Date Due Influenza Age 5 to Adult 8/1/2017 OB 3RD TRIMESTER TDAP 9/29/2017 PAP AKA CERVICAL CYTOLOGY 6/13/2020 Allergies as of 12/12/2017  Review Complete On: 12/12/2017 By: Elizabeth Pall Severity Noted Reaction Type Reactions Bee Sting [Sting, Bee] High 09/17/2017    Anaphylaxis Current Immunizations  Never Reviewed Name Date Rho(D) Immune Globulin - IM 10/12/2017 Not reviewed this visit Vitals BP Weight(growth percentile) LMP BMI OB Status Smoking Status 126/72 200 lb (90.7 kg) 04/07/2017 (Exact Date) 33.28 kg/m2 Pregnant Never Smoker BMI and BSA Data Body Mass Index Body Surface Area  
 33.28 kg/m 2 2.04 m 2 Preferred Pharmacy Pharmacy Name Phone [facility] [place] [address] [facility] [phone] Your Updated Medication List  
  
   
This list is accurate as of: 12/12/17  1:19 PM.  Always use your most recent med list.  
  
  
  
  
 azithromycin 250 mg tablet Commonly known as:  [place] 2 tabs po today then 1 tab po daily x 4 days PNV Comb. No76-Iron,Carbonyl-FA 29 mg iron- 1 mg Tab Commonly known as:  PNV 29-1 Take 1 Tab by mouth daily. Any generic prenatal vitamin will do.  
  
 prenatal vit-iron fumarate-fa 27 mg iron- 0.8 mg Tab tablet Take 1 Tab by mouth daily.   
  
  
  
  
To-Do List   
 12/12/2017 1:30 PM  
 Appointment with ULTRASOUND 1 SFM at [facility] ([phone]) Introducing [facility] SERVICES! Dear Beatriz Pagan: Thank you for requesting a Night & Day Studios account. Our records indicate that you already have an active Night & Day Studios account. You can access your account anytime at [email or web address] Software. [employer]/Pulian Software Did you know that you can access your hospital and ER discharge instructions at any time in Night & Day Studios? You can also review all of your test results from your hospital stay or ER visit. Additional Information If you have questions, please visit the Frequently Asked Questions section of the Night & Day Studios website at [email or web address] Remember, Night & Day Studios is NOT to be used for urgent needs. For medical emergencies, dial 911. Now available from your iPhone and Android! Please provide this summary of care documentation to your next provider. Your primary care clinician is listed as Harika Rhodes. If you have any questions after today's visit, please call [phone].

## 2017-12-19 ENCOUNTER — HOSPITAL ENCOUNTER (OUTPATIENT)
Dept: PERINATAL CARE | Age: 42
Discharge: HOME | End: 2017-12-19
Attending: OBSTETRICS & GYNECOLOGY
Payer: COMMERCIAL

## 2017-12-19 PROCEDURE — 76818 FETAL BIOPHYS PROFILE W/NST: CPT | Performed by: OBSTETRICS & GYNECOLOGY

## 2017-12-22 ENCOUNTER — HOSPITAL ENCOUNTER (INPATIENT)
Age: 42
LOS: 3 days | Discharge: HOME | DRG: 540 | End: 2017-12-25
Attending: OBSTETRICS & GYNECOLOGY | Admitting: OBSTETRICS & GYNECOLOGY
Payer: COMMERCIAL

## 2017-12-22 ENCOUNTER — ANESTHESIA (OUTPATIENT)
Dept: LABOR AND DELIVERY | Age: 42
DRG: 540 | End: 2017-12-22
Payer: COMMERCIAL

## 2017-12-22 ENCOUNTER — ANESTHESIA EVENT (OUTPATIENT)
Dept: LABOR AND DELIVERY | Age: 42
DRG: 540 | End: 2017-12-22
Payer: COMMERCIAL

## 2017-12-22 DIAGNOSIS — R52 POSTPARTUM PAIN: Primary | ICD-10-CM

## 2017-12-22 PROBLEM — Z34.90 PREGNANT: Status: ACTIVE | Noted: 2017-12-22

## 2017-12-22 LAB
ABO + RH BLD: NORMAL
BASOPHILS # BLD: 0 K/UL (ref 0–0.1)
BASOPHILS NFR BLD: 0 % (ref 0–1)
BLOOD GROUP ANTIBODIES SERPL: NORMAL
EOSINOPHIL # BLD: 0.1 K/UL (ref 0–0.4)
EOSINOPHIL NFR BLD: 1 % (ref 0–7)
ERYTHROCYTE [DISTWIDTH] IN BLOOD BY AUTOMATED COUNT: 15.4 % (ref 11.5–14.5)
HCT VFR BLD AUTO: 35.9 % (ref 35–47)
HGB BLD-MCNC: 12 G/DL (ref 11.5–16)
LYMPHOCYTES # BLD: 1.9 K/UL (ref 0.8–3.5)
LYMPHOCYTES NFR BLD: 16 % (ref 12–49)
MCH RBC QN AUTO: 27.8 PG (ref 26–34)
MCHC RBC AUTO-ENTMCNC: 33.4 G/DL (ref 30–36.5)
MCV RBC AUTO: 83.3 FL (ref 80–99)
MONOCYTES # BLD: 1.5 K/UL (ref 0–1)
MONOCYTES NFR BLD: 13 % (ref 5–13)
NEUTS SEG # BLD: 8 K/UL (ref 1.8–8)
NEUTS SEG NFR BLD: 70 % (ref 32–75)
PLATELET # BLD AUTO: 263 K/UL (ref 150–400)
RBC # BLD AUTO: 4.31 M/UL (ref 3.8–5.2)
SPECIMEN EXP DATE BLD: NORMAL
WBC # BLD AUTO: 11.5 K/UL (ref 3.6–11)

## 2017-12-22 PROCEDURE — 76010000391 HC C SECN FIRST 1 HR: Performed by: OBSTETRICS & GYNECOLOGY

## 2017-12-22 PROCEDURE — 74011250637 HC RX REV CODE- 250/637: Performed by: OBSTETRICS & GYNECOLOGY

## 2017-12-22 PROCEDURE — 85025 COMPLETE CBC W/AUTO DIFF WBC: CPT | Performed by: OBSTETRICS & GYNECOLOGY

## 2017-12-22 PROCEDURE — 77030034850

## 2017-12-22 PROCEDURE — 76060000078 HC EPIDURAL ANESTHESIA: Performed by: OBSTETRICS & GYNECOLOGY

## 2017-12-22 PROCEDURE — 74011250636 HC RX REV CODE- 250/636: Performed by: OBSTETRICS & GYNECOLOGY

## 2017-12-22 PROCEDURE — 77030011640 HC PAD GRND REM COVD -A

## 2017-12-22 PROCEDURE — 77030018809 HC RETRCTR ALXSO DISP AMR -B

## 2017-12-22 PROCEDURE — 75410000003 HC RECOV DEL/VAG/CSECN EA 0.5 HR: Performed by: OBSTETRICS & GYNECOLOGY

## 2017-12-22 PROCEDURE — 77030032490 HC SLV COMPR SCD KNE COVD -B

## 2017-12-22 PROCEDURE — 77030007866 HC KT SPN ANES BBMI -B: Performed by: NURSE ANESTHETIST, CERTIFIED REGISTERED

## 2017-12-22 PROCEDURE — 36415 COLL VENOUS BLD VENIPUNCTURE: CPT | Performed by: OBSTETRICS & GYNECOLOGY

## 2017-12-22 PROCEDURE — 77010026064 HC OXYGEN INFANT MED AIR MIN: Performed by: OBSTETRICS & GYNECOLOGY

## 2017-12-22 PROCEDURE — 77030018836 HC SOL IRR NACL ICUM -A

## 2017-12-22 PROCEDURE — 86900 BLOOD TYPING SEROLOGIC ABO: CPT | Performed by: OBSTETRICS & GYNECOLOGY

## 2017-12-22 PROCEDURE — 74011250636 HC RX REV CODE- 250/636

## 2017-12-22 PROCEDURE — 65270000029 HC RM PRIVATE

## 2017-12-22 PROCEDURE — 74011000250 HC RX REV CODE- 250

## 2017-12-22 RX ORDER — IBUPROFEN 800 MG/1
800 TABLET ORAL EVERY 8 HOURS
Status: DISCONTINUED | OUTPATIENT
Start: 2017-12-22 | End: 2017-12-25 | Stop reason: HOSPADM

## 2017-12-22 RX ORDER — SODIUM CHLORIDE 0.9 % (FLUSH) 0.9 %
5-10 SYRINGE (ML) INJECTION AS NEEDED
Status: DISCONTINUED | OUTPATIENT
Start: 2017-12-22 | End: 2017-12-25 | Stop reason: HOSPADM

## 2017-12-22 RX ORDER — SODIUM CHLORIDE, SODIUM LACTATE, POTASSIUM CHLORIDE, CALCIUM CHLORIDE 600; 310; 30; 20 MG/100ML; MG/100ML; MG/100ML; MG/100ML
125 INJECTION, SOLUTION INTRAVENOUS CONTINUOUS
Status: DISCONTINUED | OUTPATIENT
Start: 2017-12-22 | End: 2017-12-25 | Stop reason: HOSPADM

## 2017-12-22 RX ORDER — SIMETHICONE 80 MG
80 TABLET,CHEWABLE ORAL AS NEEDED
Status: DISCONTINUED | OUTPATIENT
Start: 2017-12-22 | End: 2017-12-25 | Stop reason: HOSPADM

## 2017-12-22 RX ORDER — SODIUM CHLORIDE 0.9 % (FLUSH) 0.9 %
5-10 SYRINGE (ML) INJECTION EVERY 8 HOURS
Status: DISCONTINUED | OUTPATIENT
Start: 2017-12-22 | End: 2017-12-25 | Stop reason: HOSPADM

## 2017-12-22 RX ORDER — ONDANSETRON 2 MG/ML
INJECTION INTRAMUSCULAR; INTRAVENOUS AS NEEDED
Status: DISCONTINUED | OUTPATIENT
Start: 2017-12-22 | End: 2017-12-22 | Stop reason: HOSPADM

## 2017-12-22 RX ORDER — SODIUM CHLORIDE 0.9 % (FLUSH) 0.9 %
5-10 SYRINGE (ML) INJECTION AS NEEDED
Status: DISCONTINUED | OUTPATIENT
Start: 2017-12-22 | End: 2017-12-22 | Stop reason: HOSPADM

## 2017-12-22 RX ORDER — OXYCODONE AND ACETAMINOPHEN 5; 325 MG/1; MG/1
2 TABLET ORAL
Status: DISCONTINUED | OUTPATIENT
Start: 2017-12-22 | End: 2017-12-25 | Stop reason: HOSPADM

## 2017-12-22 RX ORDER — OXYTOCIN/RINGER'S LACTATE 20/1000 ML
125-500 PLASTIC BAG, INJECTION (ML) INTRAVENOUS ONCE
Status: ACTIVE | OUTPATIENT
Start: 2017-12-22 | End: 2017-12-22

## 2017-12-22 RX ORDER — ZOLPIDEM TARTRATE 5 MG/1
5 TABLET ORAL
Status: DISCONTINUED | OUTPATIENT
Start: 2017-12-22 | End: 2017-12-25 | Stop reason: HOSPADM

## 2017-12-22 RX ORDER — NALOXONE HYDROCHLORIDE 0.4 MG/ML
0.4 INJECTION, SOLUTION INTRAMUSCULAR; INTRAVENOUS; SUBCUTANEOUS AS NEEDED
Status: DISCONTINUED | OUTPATIENT
Start: 2017-12-22 | End: 2017-12-25 | Stop reason: HOSPADM

## 2017-12-22 RX ORDER — CEFAZOLIN SODIUM IN 0.9 % NACL 2 G/50 ML
2 INTRAVENOUS SOLUTION, PIGGYBACK (ML) INTRAVENOUS ONCE
Status: COMPLETED | OUTPATIENT
Start: 2017-12-22 | End: 2017-12-22

## 2017-12-22 RX ORDER — DIPHENHYDRAMINE HCL 25 MG
25 CAPSULE ORAL
Status: DISCONTINUED | OUTPATIENT
Start: 2017-12-22 | End: 2017-12-25 | Stop reason: HOSPADM

## 2017-12-22 RX ORDER — KETOROLAC TROMETHAMINE 30 MG/ML
INJECTION, SOLUTION INTRAMUSCULAR; INTRAVENOUS AS NEEDED
Status: DISCONTINUED | OUTPATIENT
Start: 2017-12-22 | End: 2017-12-22 | Stop reason: HOSPADM

## 2017-12-22 RX ORDER — DOCUSATE SODIUM 100 MG/1
100 CAPSULE, LIQUID FILLED ORAL 2 TIMES DAILY
Status: DISCONTINUED | OUTPATIENT
Start: 2017-12-22 | End: 2017-12-25 | Stop reason: HOSPADM

## 2017-12-22 RX ORDER — BUPIVACAINE HYDROCHLORIDE 7.5 MG/ML
INJECTION, SOLUTION EPIDURAL; RETROBULBAR AS NEEDED
Status: DISCONTINUED | OUTPATIENT
Start: 2017-12-22 | End: 2017-12-22 | Stop reason: HOSPADM

## 2017-12-22 RX ORDER — SODIUM CHLORIDE 0.9 % (FLUSH) 0.9 %
5-10 SYRINGE (ML) INJECTION EVERY 8 HOURS
Status: DISCONTINUED | OUTPATIENT
Start: 2017-12-22 | End: 2017-12-22 | Stop reason: HOSPADM

## 2017-12-22 RX ORDER — KETOROLAC TROMETHAMINE 30 MG/ML
30 INJECTION, SOLUTION INTRAMUSCULAR; INTRAVENOUS
Status: DISPENSED | OUTPATIENT
Start: 2017-12-22 | End: 2017-12-23

## 2017-12-22 RX ORDER — MORPHINE SULFATE 0.5 MG/ML
INJECTION, SOLUTION EPIDURAL; INTRATHECAL; INTRAVENOUS AS NEEDED
Status: DISCONTINUED | OUTPATIENT
Start: 2017-12-22 | End: 2017-12-22 | Stop reason: HOSPADM

## 2017-12-22 RX ORDER — SODIUM CHLORIDE, SODIUM LACTATE, POTASSIUM CHLORIDE, CALCIUM CHLORIDE 600; 310; 30; 20 MG/100ML; MG/100ML; MG/100ML; MG/100ML
1000 INJECTION, SOLUTION INTRAVENOUS CONTINUOUS
Status: DISCONTINUED | OUTPATIENT
Start: 2017-12-22 | End: 2017-12-22 | Stop reason: HOSPADM

## 2017-12-22 RX ORDER — OXYCODONE AND ACETAMINOPHEN 5; 325 MG/1; MG/1
1-2 TABLET ORAL
Qty: 28 TAB | Refills: 0 | Status: SHIPPED | OUTPATIENT
Start: 2017-12-22

## 2017-12-22 RX ORDER — OXYTOCIN 10 [USP'U]/ML
INJECTION, SOLUTION INTRAMUSCULAR; INTRAVENOUS AS NEEDED
Status: DISCONTINUED | OUTPATIENT
Start: 2017-12-22 | End: 2017-12-22 | Stop reason: HOSPADM

## 2017-12-22 RX ADMIN — OXYCODONE HYDROCHLORIDE AND ACETAMINOPHEN 2 TABLET: 5; 325 TABLET ORAL at 22:48

## 2017-12-22 RX ADMIN — CEFAZOLIN 2 G: 1 INJECTION, POWDER, FOR SOLUTION INTRAMUSCULAR; INTRAVENOUS; PARENTERAL at 10:01

## 2017-12-22 RX ADMIN — DIPHENHYDRAMINE HYDROCHLORIDE 25 MG: 25 CAPSULE ORAL at 13:20

## 2017-12-22 RX ADMIN — OXYCODONE HYDROCHLORIDE AND ACETAMINOPHEN 2 TABLET: 5; 325 TABLET ORAL at 14:18

## 2017-12-22 RX ADMIN — SODIUM CHLORIDE, SODIUM LACTATE, POTASSIUM CHLORIDE, AND CALCIUM CHLORIDE 1000 ML: 600; 310; 30; 20 INJECTION, SOLUTION INTRAVENOUS at 09:14

## 2017-12-22 RX ADMIN — KETOROLAC TROMETHAMINE 30 MG: 30 INJECTION, SOLUTION INTRAMUSCULAR at 22:48

## 2017-12-22 RX ADMIN — ONDANSETRON 4 MG: 2 INJECTION INTRAMUSCULAR; INTRAVENOUS at 10:33

## 2017-12-22 RX ADMIN — SODIUM CHLORIDE, SODIUM LACTATE, POTASSIUM CHLORIDE, AND CALCIUM CHLORIDE: 600; 310; 30; 20 INJECTION, SOLUTION INTRAVENOUS at 10:31

## 2017-12-22 RX ADMIN — SODIUM CHLORIDE, SODIUM LACTATE, POTASSIUM CHLORIDE, AND CALCIUM CHLORIDE 125 ML/HR: 600; 310; 30; 20 INJECTION, SOLUTION INTRAVENOUS at 22:48

## 2017-12-22 RX ADMIN — Medication 10 ML: at 22:48

## 2017-12-22 RX ADMIN — SODIUM CHLORIDE, SODIUM LACTATE, POTASSIUM CHLORIDE, AND CALCIUM CHLORIDE 125 ML/HR: 600; 310; 30; 20 INJECTION, SOLUTION INTRAVENOUS at 15:23

## 2017-12-22 RX ADMIN — SODIUM CHLORIDE, SODIUM LACTATE, POTASSIUM CHLORIDE, AND CALCIUM CHLORIDE 500 ML/HR: 600; 310; 30; 20 INJECTION, SOLUTION INTRAVENOUS at 13:15

## 2017-12-22 RX ADMIN — KETOROLAC TROMETHAMINE 30 MG: 30 INJECTION, SOLUTION INTRAMUSCULAR; INTRAVENOUS at 10:55

## 2017-12-22 RX ADMIN — SODIUM CHLORIDE, SODIUM LACTATE, POTASSIUM CHLORIDE, AND CALCIUM CHLORIDE 1000 ML: 600; 310; 30; 20 INJECTION, SOLUTION INTRAVENOUS at 08:08

## 2017-12-22 RX ADMIN — OXYTOCIN 40 UNITS: 10 INJECTION, SOLUTION INTRAMUSCULAR; INTRAVENOUS at 10:31

## 2017-12-22 RX ADMIN — MORPHINE SULFATE 250 MCG: 0.5 INJECTION, SOLUTION EPIDURAL; INTRATHECAL; INTRAVENOUS at 10:15

## 2017-12-22 RX ADMIN — Medication 10 ML: at 08:04

## 2017-12-22 RX ADMIN — BUPIVACAINE HYDROCHLORIDE 1.6 ML: 7.5 INJECTION, SOLUTION EPIDURAL; RETROBULBAR at 10:15

## 2017-12-22 NOTE — PROGRESS NOTES
SBAR IN Report: Mother    Verbal report received from [address] [place] [address] (full name & credentials) on this patient, who is now being transferred from MIU (unit) for routine progression of care. The patient is not wearing a green \"Anesthesia-Duramorph\" band. Report consisted of patient's Situation, Background, Assessment and Recommendations (SBAR). [place] ID bands were compared with the identification form, and verified with the patient and transferring nurse. Information from the SBAR, Kardex, Procedure Summary, Intake/Output, MAR and Recent Results and the Pavithra Report was reviewed with the transferring nurse; opportunity for questions and clarification provided.

## 2017-12-22 NOTE — OP NOTES
Section Delivery Procedure Note    Name: Rancho Kaye   Medical Record Number: [id number]   YOB: 1975  Today's Date: 2017      Preoperative Diagnosis: Breech    Postoperative Diagnosis: RYLIE    Procedure: Low Transverse  Section    Surgeon(s):  Liliam Atkinson MD    Anesthesia: Spinal    Prophylactic Antibiotics: Ancef         Fetal Description: hurst female    Birth Information:   Information for the patient's :  Paul Cintron, Female [[id number]]          Umbilical Cord: 3 vessels present    Placenta:  manual removal          Complications:  none      Procedure Detail:      After proper patient identification and consent, the patient was taken to the operating room, where spinal anesthesia was administered and found to be adequate. Bruner catheter had been placed using sterile technique. The patient was prepped and draped in the normal sterile fashion. The abdomen was entered using the Pfannenstiel technique. The peritoneum was entered bluntely well superior to the bladder without any apparent injury. An Hector retractor was placed. Palpation revealed no bowel below the retractor. The bladder flap was created without difficulty. A low transverse uterine incision was made with the scalpel and extended with blunt finger dissection. Amniotomy was performed and the fluid was large amount clear. The babys head was then delivered atraumatically. The nose and mouth were suctioned. The cord was clamped and cut and the baby was handed off to Nursing staff in attendance and  staff in attendance. The uterus was wiped clean with a moist lap pad and cleared of all clots and debris. The uterine incision was closed in 2 layers, first with a running locked suture of 0-Vicryl, then with an imbricated layer. Adequate hemostasis was noted. Both tubes and ovaries appeared normal. The pericolic gutters were then lavaged clean with normal saline.  Good hemostasis was again reassured The Hector retractor was removed. The fascia was closed with #1 vicryl  in a running fashion. Good hemostasis was assured. The incision was lavaged clean and small bleeders were coagulated with the bovie. The skin was closed with 4-0 monocryl in subcuticular fashion. The patient tolerated the procedure well. Sponge, lap, and needle counts were correct times three and the patient and baby were taken to recovery/postpartum room in stable condition.     Faustine Cogan, MD  December 22, 2017

## 2017-12-22 NOTE — ANESTHESIA PREPROCEDURE EVALUATION
Anesthetic History   No history of anesthetic complications            Review of Systems / Medical History  Patient summary reviewed and pertinent labs reviewed    Pulmonary              Pertinent negatives: Smoker: quit smoking with pregnancy.      Neuro/Psych   Within defined limits           Cardiovascular  Within defined limits                Exercise tolerance: >4 METS     GI/Hepatic/Renal  Within defined limits              Endo/Other  Within defined limits           Other Findings              Physical Exam    Airway  Mallampati: III  TM Distance: 4 - 6 cm  Neck ROM: normal range of motion   Mouth opening: Normal     Cardiovascular    Rhythm: regular  Rate: normal         Dental    Dentition: Upper dentition intact and Lower dentition intact     Pulmonary  Breath sounds clear to auscultation               Abdominal         Other Findings            Anesthetic Plan    ASA: 2  Anesthesia type: spinal          Induction: Intravenous  Anesthetic plan and risks discussed with: Patient

## 2017-12-22 NOTE — ANESTHESIA PROCEDURE NOTES
Spinal Block    Start time: 12/22/2017 10:07 AM  End time: 12/22/2017 10:17 AM  Performed by: Candace Phillips  Authorized by: Candace Phillips     Pre-procedure:   Indications: at surgeon's request and primary anesthetic  Preanesthetic Checklist: patient identified, risks and benefits discussed, anesthesia consent and timeout performed    Timeout Time: 10:07          Spinal Block:   Patient Position:  Seated  Prep Region:  Lumbar  Prep: chlorhexidine      Location:  L4-5  Technique:  Single shot    Local Dose (mL):  3    Needle:   Needle Type:  Pencan  Needle Gauge:  25 G  Attempts:  1      Events: CSF confirmed, no blood with aspiration and no paresthesia        Assessment:  Insertion:  Uncomplicated  Patient tolerance:  Patient tolerated the procedure well with no immediate complications

## 2017-12-22 NOTE — PROGRESS NOTES
46- Patient admitted to Room 206 for a scheduled  for breech. Patient placed on the monitor. Assumed care of the patient at this time. 1087- Spoke to Dr. Dixon Vazquez and informed her that the patients  will be delayed due to another case. 1234- Fluid challenge started due to a decrease in the patients urine output. 200- Updated Dr. Arina Nina on the patients urine output. 8901- Patient transferred to MIU room 312. Bedside SBAR report given to Kamini Bustamante RN. Care of the patient turned over at this time. Infant ID bands verified.

## 2017-12-22 NOTE — H&P
History & Physical    Name: Maxime Shetty MRN: [id number]  SSN: [id number]    YOB: 1975  Age: 36 y.o. Sex: female        Subjective:     Estimated Date of Delivery: 17  OB History      Para Term  AB Living    3 0 0 0 2 0    SAB TAB Ectopic Molar Multiple Live Births    2 0 0 0 0 0          MsDanny Robin is admitted with pregnancy at 39w0d for  Section. Prenatal course was complicated by advanced maternal age and breech. CSP not visualized this pregnancy and mild ventriculomegaly was noted and followed by [facility] throughout the pregnancy. Please see prenatal records for details. Past Medical History:   Diagnosis Date    Arrhythmia     congenital heart murmur     History reviewed. No pertinent surgical history. Social History     Occupational History    Not on file. Social History Main Topics    Smoking status: Former Smoker    Smokeless tobacco: Never Used    Alcohol use No    Drug use: No    Sexual activity: Yes     Partners: Male     Birth control/ protection: None     Family History   Problem Relation Age of Onset    No Known Problems Mother        Allergies   Allergen Reactions    Bee Sting [Sting, Bee] Anaphylaxis     Prior to Admission medications    Medication Sig Start Date End Date Taking? Authorizing Provider   prenatal vit-iron fumarate-fa 27 mg iron- 0.8 mg tab tablet Take 1 Tab by mouth daily. 17  Yes Rudolph Rosales MD   azithromycin (ZITHROMAX) 250 mg tablet 2 tabs po today then 1 tab po daily x 4 days 17   Rudolph Rosales MD   PNV Comb. No76-Iron,Carbonyl-FA (PNV 29-1) 29 mg iron- 1 mg tab Take 1 Tab by mouth daily. Any generic prenatal vitamin will do. 8/15/17   Rudolph Rosales MD        Review of Systems: A comprehensive review of systems was negative except for that written in the HPI.     Objective:     Vitals:  Vitals:    17 0857 17 0902 17 0907 17 0912   BP:       Pulse:       Resp:       Temp:       SpO2: 100% 100% 100% 100%   Weight:       Height:            Physical Exam:  Patient without distress. Heart: Regular rate and rhythm  Lung: normal respiratory effort  Abdomen: soft, nontender  Membranes:  Intact  Fetal Heart Rate: Reactive    Prenatal Labs:   Lab Results   Component Value Date/Time    Rubella, External Immune 2017    GrBStrep, External Negative 2017    HBsAg, External Negative 2017    HIV, External Negative 2017    Gonorrhea, External Negative 2017    Chlamydia, External Negative 2017        Assessment/Plan:     Plan: Admit for Reassuring fetal status, Proceed with  Section for breech. CSP not visualized and mild ventriculomegaly communicated to NICU    Recommended proceeding with  delivery. Risks of bleeding, infection, bladder and bowel damage explained to patient. They understand the situation and consent to the  delivery. .  Group B Strep was negative.     Signed By:  Hernandez Wilkinson MD     2017

## 2017-12-22 NOTE — IP AVS SNAPSHOT
Shan Kinsey 
 
 
 [address] 
[phone] Patient: David Ko MRN: [id number] :1975 My Medications TAKE these medications as instructed Instructions Each Dose to Equal  
 Morning Noon Evening Bedtime  
 oxyCODONE-acetaminophen 5-325 mg per tablet Commonly known as:  PERCOCET Your last dose was: Your next dose is: Take 1-2 Tabs by mouth every four (4) hours as needed for Pain. Max Daily Amount: 12 Tabs. 1-2 Tab PNV Comb. No76-Iron,Carbonyl-FA 29 mg iron- 1 mg Tab Commonly known as:  PNV 29-1 Your last dose was: Your next dose is: Take 1 Tab by mouth daily. Any generic prenatal vitamin will do.  
 1 Tab ASK your physician about these medications Instructions Each Dose to Equal  
 Morning Noon Evening Bedtime  
 azithromycin 250 mg tablet Commonly known as:  Arcenio Young Your last dose was: Your next dose is:    
   
   
 2 tabs po today then 1 tab po daily x 4 days  
     
   
   
   
  
 prenatal vit-iron fumarate-fa 27 mg iron- 0.8 mg Tab tablet Your last dose was: Your next dose is: Take 1 Tab by mouth daily. 1 Tab Where to Get Your Medications Information on where to get these meds will be given to you by the nurse or doctor. ! Ask your nurse or doctor about these medications  
  oxyCODONE-acetaminophen 5-325 mg per tablet

## 2017-12-22 NOTE — LACTATION NOTE
Ck Em Lactation Consultant Signed  Progress Notes Date of Service: 12/22/17 1755         Problem: Lactation Care Plan  Goal: *Infant latching appropriately  Outcome: Progressing Towards Goal  Pt will successfully establish breastfeeding by feeding in response to infant's early feeding cues and/or to offer breast every 2-3 hours. Ways to obtain a deep latch and seek comfortable positioning shared, aware to keep log of feedings/output. Goal: *Weight loss less than 10% of birth weight  Outcome: Progressing Towards Goal     Encouraged mom to attempt feeding with baby led feeding cues. Just as sucking on fingers, rooting, mouthing. Looking for 8-12 feedings in 24 hours. Don't limit baby at breast, allow baby to come of breast on it's own. Baby may want to feed  often and may increase number of feedings on second day of life. Skin to skin encouraged.       If baby doesn't nurse,  Mom should  hand express  10-20 drops of colostrum and drip into baby's mouth, or give to baby by finger feeding, cup feeding, or spoon feeding at least every 2-3 hours.      Problem: Patient Education: Go to Patient Education Activity  Goal: Patient/Family Education  Outcome: Progressing Towards Goal  Discussed with mother her plan for feeding. Reviewed the benefits of exclusive breast milk feeding during the hospital stay. Informed her of the risks of using formula to supplement in the first few days of life as well as the benefits of successful breast milk feeding; referred her to the Breastfeeding booklet about this information. She acknowledges understanding of information reviewed and states that it is her plan to breast/formula feed her infant. Will support her choice and offer additional information as needed.      Hand Expression Education:  Mom taught how to manually hand express her colostrum.   Emphasized the importance of providing infant with valuable colostrum as infant rests skin to skin at breast.  Aware to avoid extended periods of non-feeding. Aware to offer 10-20+ drops of colostrum every 2-3 hours until infant is latching and nursing effectively. Taught the rationale behind this low tech but highly effective evidence based practice.     Pt chooses to do both breast and bottle. Discussed effects of early supplementation on breastfeeding success; may decrease breastmilk production and supply, increase risk for pathological engorgement, baby may develop preference for faster flow from bottles vs breast, and baby's stomach can be stretched if larger volumes of formula are given.     Comments: Pt will successfully establish breastfeeding by feeding in response to early feeding cues   or wake every 3h, will obtain deep latch, and will keep log of feedings/output. Taught to BF at hunger cues and or q 2-3 hrs and to offer 10-20 drops of hand expressed colostrum at any non-feeds.       Breast Assessment  Left Breast: Medium, Large  Left Nipple: Everted, Intact  Right Breast: Medium, Large  Right Nipple: Everted, Intact  Breast- Feeding Assessment  Attends Breast-Feeding Classes: Yes  Breast-Feeding Experience: No  Breast Trauma/Surgery: No  Type/Quality: [place]  Lactation Consultant Visits  Breast-Feedings: Fair (Mother put baby to breast. Baby sleepy. Multiple attempts made to wake baby. baby did manage to take a few sucks then fell asleep. Mom plans to also offer formula. Instructed to offer baby breast 1st so baby gets moms antibodies and immunity.)  Mother/Infant Observation  Mother Observation: Alignment, Breast comfortable, Close hold, Holds breast  Infant Observation: Latches nipple and aereolae, Lips flanged, lower, Lips flanged, upper, Opens mouth  LATCH Documentation  Latch: Repeated attempts, hold nipple in mouth, stimulate to suck  Audible Swallowing: None  Type of Nipple: Everted (after stimulation)  Comfort (Breast/Nipple): Soft/non-tender  Hold (Positioning): Full assist, teach one side, mother does other, staff holds  LATCH Score: 6

## 2017-12-22 NOTE — ANESTHESIA POSTPROCEDURE EVALUATION
Post-Anesthesia Evaluation and Assessment    Patient: Guadalupe Carter MRN: [id number]  SSN: [id number]    YOB: 1975  Age: 36 y.o. Sex: female       Cardiovascular Function/Vital Signs  Visit Vitals    /62    Pulse 70    Temp 36.9 °C (98.4 °F)    Resp 16    Ht 5' 5\" (1.651 m)    Wt 90.7 kg (200 lb)    SpO2 100%    Breastfeeding Unknown    BMI 33.28 kg/m2       Patient is status post spinal anesthesia for Procedure(s):   SECTION. Nausea/Vomiting: None    Postoperative hydration reviewed and adequate. Pain:  Pain Scale 1: Numeric (0 - 10) (17 1106)  Pain Intensity 1: 0 (17 110)   Managed    Neurological Status:   Neuro (WDL): Exceptions to WDL (17 111)  Neuro  LLE Motor Response: Numbness; Pharmacologically paralyzed;Weak (17 111)  RLE Motor Response: Numbness; Pharmacologically paralyzed;Weak (17 111)   At baseline    Mental Status and Level of Consciousness: Arousable    Pulmonary Status:   O2 Device: Room air (17 111)   Adequate oxygenation and airway patent    Complications related to anesthesia: None    Post-anesthesia assessment completed.  No concerns    Signed By: Beatriz Fischer MD     2017

## 2017-12-22 NOTE — ANESTHESIA POSTPROCEDURE EVALUATION
Post-Anesthesia Evaluation and Assessment    Patient: Edgardo York MRN: [id number]  SSN: [id number]    YOB: 1975  Age: 36 y.o. Sex: female       Cardiovascular Function/Vital Signs  Visit Vitals    /62    Pulse 70    Temp 36.9 °C (98.4 °F)    Resp 16    Ht 5' 5\" (1.651 m)    Wt 90.7 kg (200 lb)    SpO2 100%    Breastfeeding Unknown    BMI 33.28 kg/m2       Patient is status post spinal anesthesia for Procedure(s):   SECTION. Nausea/Vomiting: None    Postoperative hydration reviewed and adequate. Pain:  Pain Scale 1: Numeric (0 - 10) (17 1106)  Pain Intensity 1: 0 (17 110)   Managed    Neurological Status:   Neuro (WDL): Exceptions to WDL (17 111)  Neuro  LLE Motor Response: Numbness; Pharmacologically paralyzed;Weak (17 111)  RLE Motor Response: Numbness; Pharmacologically paralyzed;Weak (17 111)   At baseline    Mental Status and Level of Consciousness: Arousable    Pulmonary Status:   O2 Device: Room air (17 111)   Adequate oxygenation and airway patent    Complications related to anesthesia: None    Post-anesthesia assessment completed.  No concerns    Signed By: Joe Camp MD     2017

## 2017-12-22 NOTE — IP AVS SNAPSHOT
Alberto Franceshelen 
 
 
 [address] [facility] 
[phone] Patient: Della Padilla MRN: [id number] :1975 About your hospitalization You were admitted on:  2017 You last received care in the:  [facility] You were discharged on:  2017 Why you were hospitalized Your primary diagnosis was:  Not on File Your diagnoses also included:  Pregnant Things You Need To Do (next 8 weeks) Follow up with Dionicio Cogan, MD in 6 week(s) Phone:  [phone] Where:  [address], [facility] [address] [facility] Follow up with  in 6 week(s) Follow up with  in 6 week(s) Follow up with  in 2 week(s) Follow up with  in 6 week(s) Discharge Orders None A check barbara indicates which time of day the medication should be taken. My Medications TAKE these medications as instructed Instructions Each Dose to Equal  
 Morning Noon Evening Bedtime  
 oxyCODONE-acetaminophen 5-325 mg per tablet Commonly known as:  PERCOCET Your last dose was: Your next dose is: Take 1-2 Tabs by mouth every four (4) hours as needed for Pain. Max Daily Amount: 12 Tabs. 1-2 Tab PNV Comb. No76-Iron,Carbonyl-FA 29 mg iron- 1 mg Tab Commonly known as:  PNV 29-1 Your last dose was: Your next dose is: Take 1 Tab by mouth daily. Any generic prenatal vitamin will do.  
 1 Tab ASK your physician about these medications Instructions Each Dose to Equal  
 Morning Noon Evening Bedtime  
 azithromycin 250 mg tablet Commonly known as:  Lucy Austen Your last dose was: Your next dose is:    
   
   
 2 tabs po today then 1 tab po daily x 4 days  
     
   
   
   
  
 prenatal vit-iron fumarate-fa 27 mg iron- 0.8 mg Tab tablet Your last dose was: Your next dose is: Take 1 Tab by mouth daily. 1 Tab Where to Get Your Medications Information on where to get these meds will be given to you by the nurse or doctor. ! Ask your nurse or doctor about these medications  
  oxyCODONE-acetaminophen 5-325 mg per tablet Discharge Instructions POST DELIVERY DISCHARGE INSTRUCTIONS Name: Dipti Adam YOB: 1975 Primary Diagnosis: Active Problems: 
  Pregnant (2017) General:  
 
Diet/Diet Restrictions: 
Eight 8-ounce glasses of fluid daily (water, juices); avoid excessive caffeine intake. Meals/snacks as desired which are high in fiber and carbohydrates and low in fat and cholesterol. Medications:  
 
 
Physical Activity / Restrictions / Safety:  
 
Avoid heavy lifting, no more that 8 lbs. For 2-3 weeks; No driving while taking narcotic pain medication. Post  patients should not drive until pain free. No intercourse 4-6 weeks, no douching or tampon use. May resume exercise in 6 weeks. Discharge Instructions/Special Treatment/Home Care Needs:  
 
Continue prenatal vitamins. Continue to use squirt bottle with warm water on your episiotomy after each bathroom use until bleeding stops. If steri-strips applied to your incision, remove in 7 days. Take stool softeners daily. Call your doctor for the following:  
 
Fever over 101 degrees by mouth. Vaginal bleeding heavier than a normal menstrual period or lost larger than a golf ball. Red streaks or increased swelling of legs, painful red streaks on your breast. 
Painful urination, or increased pain, redness or discharge with your incision. Pain Management:  
 
Pain Management:  
Take Acetaminophen (Tylenol) or Ibuprofen (Advil, Motrin), as directed for pain.  Use a warm Sitz bath 3 times daily to relieve episiotomy or hemorrhoidal discomfort. Heating pad to  incision as needed. For hemorrhoidal discomfort, use Tucks and Anusol cream as needed and directed. Follow-Up Care:  
 
Appointment with MD: Follow-up Appointments Procedures  FOLLOW UP VISIT Appointment in: 6 Weeks Standing Status:   Standing Number of Occurrences:   1 Order Specific Question:   Appointment in Answer:   6 Weeks Telephone number: [phone] Signed By: Jones Quan MD                                                                                                   Date: 2017 Time: 10:02 AM 
 
 
  
  
  
[employer] Announcement We are excited to announce that we are making your provider's discharge notes available to you in [employer]. You will see these notes when they are completed and signed by the physician that discharged you from your recent hospital stay. If you have any questions or concerns about any information you see in [employer], please call the Health Information Department where you were seen or reach out to your Primary Care Provider for more information about your plan of care. Introducing [facility]! Dear Huey Pablo: Thank you for requesting a [employer] account. Our records indicate that you already have an active [employer] account. You can access your account anytime at [email or web address] Did you know that you can access your hospital and ER discharge instructions at any time in [employer]? You can also review all of your test results from your hospital stay or ER visit. Additional Information If you have questions, please visit the Frequently Asked Questions section of the [employer] website at [email or web address] Remember, [employer] is NOT to be used for urgent needs. For medical emergencies, dial 911. Now available from your iPhone and Android! Providers Seen During Your Hospitalization Provider Specialty Primary office phone Rudolph Rosales MD Obstetrics & Gynecology [phone] Immunizations Administered for This Admission Name Date Rho(D) Immune Globulin - IM 12/23/2017 Your Primary Care Physician (PCP) Primary Care Physician Office Phone Office Fax Brigitte Dorantes [phone] You are allergic to the following Allergen Reactions Bee Sting (Sting, Bee) Anaphylaxis Recent Documentation Height Weight Breastfeeding? BMI OB Status Smoking Status 1.651 m 90.7 kg Unknown 33.28 kg/m2 Recent pregnancy Former Smoker Emergency Contacts Name Discharge Info Relation Home Work Mobile Isabel Gustafson DISCHARGE CAREGIVER [3] Other Relative [6] [phone] Patient Belongings The following personal items are in your possession at time of discharge: 
  Dental Appliances: None  Visual Aid: None      Home Medications: None   Jewelry: Body Piercing, With patient  Clothing: At bedside    Other Valuables: Cell Phone, With patient, Purse  Personal Items Sent to Safe: none Please provide this summary of care documentation to your next provider. Signatures-by signing, you are acknowledging that this After Visit Summary has been reviewed with you and you have received a copy. Patient Signature:  ____________________________________________________________ Date:  ____________________________________________________________  
  
Gabe Tyler Provider Signature:  ____________________________________________________________ Date:  ____________________________________________________________

## 2017-12-22 NOTE — DISCHARGE SUMMARY
Obstetrical Discharge Summary     Name: Edgardo York MRN: [id number]  SSN: [id number]    YOB: 1975  Age: 36 y.o. Sex: female      Admit Date: 2017    Discharge Date: 2017    Admitting Physician: Dale Lee MD     Attending Physician:  Dale Lee MD     * Admission Diagnoses: Breech;Pregnant    * Discharge Diagnoses:   Information for the patient's :  Christopher Baugh Female [[id number]]   Delivery of a   female infant via  on 2017 at   by . Apgars were  and . Additional Diagnoses:   Hospital Problems as of 2017  Date Reviewed: 2017          Codes Class Noted - Resolved POA    Pregnant ICD-10-CM: Z34.90  ICD-9-CM: V22.2  2017 - Present Unknown             Lab Results   Component Value Date/Time    ABO/Rh(D) O NEGATIVE 2017 08:47 AM    Rubella, External Immune 2017    GrBStrep, External Negative 2017    ABO,Rh O Negative 2017      Immunization History   Administered Date(s) Administered    Rho(D) Immune Globulin - IM 10/12/2017       * Procedures:   Procedure(s):   SECTION    * Discharge Condition: stable    * Hospital Course: Normal hospital course following the delivery. * Disposition: home with office follow-up    Discharge Medications:   Current Discharge Medication List      START taking these medications    Details   oxyCODONE-acetaminophen (PERCOCET) 5-325 mg per tablet Take 1-2 Tabs by mouth every four (4) hours as needed for Pain. Max Daily Amount: 12 Tabs. Qty: 28 Tab, Refills: 0    Associated Diagnoses: Postpartum pain         CONTINUE these medications which have NOT CHANGED    Details   PNV Comb. No76-Iron,Carbonyl-FA (PNV 29-1) 29 mg iron- 1 mg tab Take 1 Tab by mouth daily. Any generic prenatal vitamin will do. Qty: 90 Tab, Refills: 4             * Follow-up Care/Patient Instructions:   Activity: activity as tolerated  Diet: general  Wound Care: as directed    Follow-up Information     Follow up With Details Comments Contact Info    Nupur Ndiaye MD In 6 weeks  [address]  [phone]             Signed By:  Nupur Ndiaye MD     December 22, 2017

## 2017-12-22 NOTE — PROGRESS NOTES
Bedside and Verbal shift change report given to [id number] (oncoming nurse) by Bonny Silver RN (offgoing nurse). Report included the following information SBAR, Kardex, Procedure Summary, Intake/Output, MAR and Recent Results.

## 2017-12-23 LAB
BASOPHILS # BLD: 0 K/UL (ref 0–0.1)
BASOPHILS NFR BLD: 0 % (ref 0–1)
EOSINOPHIL # BLD: 0.1 K/UL (ref 0–0.4)
EOSINOPHIL NFR BLD: 1 % (ref 0–7)
ERYTHROCYTE [DISTWIDTH] IN BLOOD BY AUTOMATED COUNT: 15.5 % (ref 11.5–14.5)
HCT VFR BLD AUTO: 32.3 % (ref 35–47)
HGB BLD-MCNC: 10.5 G/DL (ref 11.5–16)
LYMPHOCYTES # BLD: 1.3 K/UL (ref 0.8–3.5)
LYMPHOCYTES NFR BLD: 10 % (ref 12–49)
MCH RBC QN AUTO: 27.9 PG (ref 26–34)
MCHC RBC AUTO-ENTMCNC: 32.5 G/DL (ref 30–36.5)
MCV RBC AUTO: 85.7 FL (ref 80–99)
MONOCYTES # BLD: 1.5 K/UL (ref 0–1)
MONOCYTES NFR BLD: 11 % (ref 5–13)
NEUTS SEG # BLD: 10.5 K/UL (ref 1.8–8)
NEUTS SEG NFR BLD: 78 % (ref 32–75)
PLATELET # BLD AUTO: 225 K/UL (ref 150–400)
RBC # BLD AUTO: 3.77 M/UL (ref 3.8–5.2)
WBC # BLD AUTO: 13.3 K/UL (ref 3.6–11)

## 2017-12-23 PROCEDURE — 85025 COMPLETE CBC W/AUTO DIFF WBC: CPT | Performed by: OBSTETRICS & GYNECOLOGY

## 2017-12-23 PROCEDURE — 74011250637 HC RX REV CODE- 250/637: Performed by: OBSTETRICS & GYNECOLOGY

## 2017-12-23 PROCEDURE — 36415 COLL VENOUS BLD VENIPUNCTURE: CPT | Performed by: OBSTETRICS & GYNECOLOGY

## 2017-12-23 PROCEDURE — 86900 BLOOD TYPING SEROLOGIC ABO: CPT | Performed by: OBSTETRICS & GYNECOLOGY

## 2017-12-23 PROCEDURE — 74011250636 HC RX REV CODE- 250/636: Performed by: OBSTETRICS & GYNECOLOGY

## 2017-12-23 PROCEDURE — 77030027138 HC INCENT SPIROMETER -A

## 2017-12-23 PROCEDURE — 65270000029 HC RM PRIVATE

## 2017-12-23 PROCEDURE — 85461 HEMOGLOBIN FETAL: CPT | Performed by: OBSTETRICS & GYNECOLOGY

## 2017-12-23 RX ADMIN — HUMAN RHO(D) IMMUNE GLOBULIN 0.3 MG: 300 INJECTION, SOLUTION INTRAMUSCULAR at 11:50

## 2017-12-23 RX ADMIN — IBUPROFEN 800 MG: 800 TABLET ORAL at 13:37

## 2017-12-23 RX ADMIN — IBUPROFEN 800 MG: 800 TABLET ORAL at 21:35

## 2017-12-23 RX ADMIN — DOCUSATE SODIUM 100 MG: 100 CAPSULE, LIQUID FILLED ORAL at 21:36

## 2017-12-23 RX ADMIN — KETOROLAC TROMETHAMINE 30 MG: 30 INJECTION, SOLUTION INTRAMUSCULAR at 04:43

## 2017-12-23 RX ADMIN — SIMETHICONE CHEW TAB 80 MG 80 MG: 80 TABLET ORAL at 22:15

## 2017-12-23 NOTE — DISCHARGE SUMMARY
Post-Operative Day 1 Progress Note    Patient now post-op day 1 following  delivery. No significant complaints. Pain controlled on medication. Catheter out this morning, normal lochia. Vitals:  Patient Vitals for the past 8 hrs:   BP Temp Pulse Resp   17 0419 131/65 98.2 °F (36.8 °C) 88 20     Temp (24hrs), Av.1 °F (36.7 °C), Min:97.5 °F (36.4 °C), Max:98.4 °F (36.9 °C)      Vital signs stable, afebrile. Exam:  Patient without distress. Abdomen soft, flat, non-tender with fundus firm. Incision  clean, dry, and intact without erythema. Lower extremities are negative for swelling, cords or tenderness. .    Labs:   Recent Results (from the past 24 hour(s))   CBC WITH AUTOMATED DIFF    Collection Time: 17  4:21 AM   Result Value Ref Range    WBC 13.3 (H) 3.6 - 11.0 K/uL    RBC 3.77 (L) 3.80 - 5.20 M/uL    HGB 10.5 (L) 11.5 - 16.0 g/dL    HCT 32.3 (L) 35.0 - 47.0 %    MCV 85.7 80.0 - 99.0 FL    MCH 27.9 26.0 - 34.0 PG    MCHC 32.5 30.0 - 36.5 g/dL    RDW 15.5 (H) 11.5 - 14.5 %    PLATELET 955 229 - 511 K/uL    NEUTROPHILS 78 (H) 32 - 75 %    LYMPHOCYTES 10 (L) 12 - 49 %    MONOCYTES 11 5 - 13 %    EOSINOPHILS 1 0 - 7 %    BASOPHILS 0 0 - 1 %    ABS. NEUTROPHILS 10.5 (H) 1.8 - 8.0 K/UL    ABS. LYMPHOCYTES 1.3 0.8 - 3.5 K/UL    ABS. MONOCYTES 1.5 (H) 0.0 - 1.0 K/UL    ABS. EOSINOPHILS 0.1 0.0 - 0.4 K/UL    ABS. BASOPHILS 0.0 0.0 - 0.1 K/UL       Assessment and Plan:  Patient doing well post- day 1. Continue routine post-op care and maternal education. May go home tomorrow. Rx on chart.

## 2017-12-23 NOTE — LACTATION NOTE
This note was copied from a baby's chart. Mom states\" I have been formula feeding. She sucks for a few and comes off, I don't have any milk. \"  Instructed Mom to call [facility] when she gets ready to feed

## 2017-12-23 NOTE — PROGRESS NOTES
Post-Partum Day Number 2    Progress note post vaginal delivery    Patient doing well post-partum without significant complaint. Voiding without difficulty, normal lochia, positive flatus. Vitals:  Patient Vitals for the past 8 hrs:   BP Temp Pulse Resp   17 0419 131/65 98.2 °F (36.8 °C) 88 20     Temp (24hrs), Av.1 °F (36.7 °C), Min:97.5 °F (36.4 °C), Max:98.4 °F (36.9 °C)      Vital signs stable, afebrile. Exam:  Patient without distress. Abdomen soft, fundus firm,  nontender               Perineum with normal lochia noted. Lower extremities are negative for swelling, cords or tenderness. Labs:   Recent Results (from the past 24 hour(s))   CBC WITH AUTOMATED DIFF    Collection Time: 17  4:21 AM   Result Value Ref Range    WBC 13.3 (H) 3.6 - 11.0 K/uL    RBC 3.77 (L) 3.80 - 5.20 M/uL    HGB 10.5 (L) 11.5 - 16.0 g/dL    HCT 32.3 (L) 35.0 - 47.0 %    MCV 85.7 80.0 - 99.0 FL    MCH 27.9 26.0 - 34.0 PG    MCHC 32.5 30.0 - 36.5 g/dL    RDW 15.5 (H) 11.5 - 14.5 %    PLATELET 146 032 - 435 K/uL    NEUTROPHILS 78 (H) 32 - 75 %    LYMPHOCYTES 10 (L) 12 - 49 %    MONOCYTES 11 5 - 13 %    EOSINOPHILS 1 0 - 7 %    BASOPHILS 0 0 - 1 %    ABS. NEUTROPHILS 10.5 (H) 1.8 - 8.0 K/UL    ABS. LYMPHOCYTES 1.3 0.8 - 3.5 K/UL    ABS. MONOCYTES 1.5 (H) 0.0 - 1.0 K/UL    ABS. EOSINOPHILS 0.1 0.0 - 0.4 K/UL    ABS. BASOPHILS 0.0 0.0 - 0.1 K/UL       Assessment:     Status post: vaginal delivery. Doing well postpartum day 2. Plan:     Routine postpartum care. Plan discharge for today with follow up in our office in 6 weeks. See discharge summary.

## 2017-12-23 NOTE — LACTATION NOTE
This note was copied from a baby's chart. Mom states\" I just fed the baby a bottle. \"  Informed patient I would not be here tomorrow, the nurses can help her breast feed

## 2017-12-23 NOTE — ROUTINE PROCESS
Bedside and Verbal shift change report given to Alexander Fitzgerald RN (oncoming nurse) by Leandro Silverio RN (offgoing nurse). Report included the following information SBAR, Kardex, Intake/Output and MAR.

## 2017-12-23 NOTE — PROGRESS NOTES
Bedside and Verbal shift change report given to Severa Leaven RN (oncoming nurse) by Bonny Silver RN (offgoing nurse). Report included the following information SBAR, Kardex, Intake/Output, MAR and Recent Results.

## 2017-12-23 NOTE — DISCHARGE SUMMARY
Obstetrical Discharge Summary     Name: Salma Tinajero MRN: [id number]  SSN: [id number]    YOB: 1975  Age: 36 y.o. Sex: female      Admit Date: 2017    Discharge Date: 2017     Admitting Physician: Guy Ortiz MD     Attending Physician:  Guy Ortiz MD      Admission Diagnoses: Breech;Pregnant    Discharge Diagnoses:   Information for the patient's :  Ani Meneses Female [[id number]]   Delivery of a 7 lb 15.9 oz (3.625 kg) female infant via , Low Transverse on 2017 at 10:30 AM  by . Apgars were 7 and 8. Additional Diagnoses:   Hospital Problems  Date Reviewed: 2017          Codes Class Noted POA    Pregnant ICD-10-CM: Z34.90  ICD-9-CM: V22.2  2017 Unknown             Lab Results   Component Value Date/Time    Rubella, External Immune 2017    GrBStrep, External Negative 2017       Immunization(s):   Immunization History   Administered Date(s) Administered    Rho(D) Immune Globulin - IM 10/12/2017        Hospital Course: Normal hospital course following the delivery. The patient was released to her home in good condition. Patient Instructions:     Reference my discharge instructions.       Follow-up Appointments   Procedures    FOLLOW UP VISIT Appointment in: 6 Weeks     Standing Status:   Standing     Number of Occurrences:   1     Order Specific Question:   Appointment in     Answer:   6 Weeks        Signed By:  Da Rubio MD     2017

## 2017-12-24 LAB
ABO + RH BLD: NORMAL
BLD PROD TYP BPU: NORMAL
BPU ID: NORMAL
FETAL SCREEN,FMHS: NORMAL
STATUS OF UNIT,%ST: NORMAL
UNIT DIVISION, %UDIV: 0
WEAK D AG RBC QL: NORMAL

## 2017-12-24 PROCEDURE — 65270000029 HC RM PRIVATE

## 2017-12-24 PROCEDURE — 74011250637 HC RX REV CODE- 250/637: Performed by: OBSTETRICS & GYNECOLOGY

## 2017-12-24 RX ORDER — HYDROMORPHONE HYDROCHLORIDE 2 MG/1
1 TABLET ORAL
Status: DISCONTINUED | OUTPATIENT
Start: 2017-12-24 | End: 2017-12-24

## 2017-12-24 RX ADMIN — IBUPROFEN 800 MG: 800 TABLET ORAL at 05:39

## 2017-12-24 RX ADMIN — IBUPROFEN 800 MG: 800 TABLET ORAL at 15:42

## 2017-12-24 NOTE — ROUTINE PROCESS
Bedside and Verbal shift change report given to Karyn Chisholm RN (oncoming nurse) by Maura Shaikh RN (offgoing nurse). Report included the following information SBAR, Kardex, Intake/Output and MAR.

## 2017-12-24 NOTE — PROGRESS NOTES
1900-Bedside and Verbal shift change report given to Dheere Bolo (oncoming nurse) by J. Daphney Aase (offgoing nurse). Report given with SBAR, Kardex, Intake/Output and MAR.

## 2017-12-24 NOTE — PROGRESS NOTES
Post-Operative Day Number 2 Progress Note    Patient doing well post-op day 2 from  delivery without significant complaints. Pain controlled on current medication. Voiding without difficulty, normal lochia. Tolerating regular diet without nausea or vomiting.  +flatus    Vitals:  Patient Vitals for the past 8 hrs:   BP Temp Pulse Resp   17 0634 140/65 97.8 °F (36.6 °C) 75 18     Temp (24hrs), Av °F (36.7 °C), Min:97.8 °F (36.6 °C), Max:98.2 °F (36.8 °C)        Exam:  Patient without distress               Lungs:  Coarse bilaterally               CV:  Regular rate and rhythm               Abdomen soft, nondistended, normal bowel sounds               Uterus: fundus firm at level of umbilicus, nontender. Incision: no erythema, exudate or induration                Lower extremities are negative for cords or tenderness; +2 swelling. Labs: No results found for this or any previous visit (from the past 24 hour(s)). Assessment and Plan:  Postoperative day #2,  uncomplicated post- course. -Incentive Spirometer 7-10 times an hour while awake.    - routine postop care  - anticipate discharge in AM, declines early discharge today

## 2017-12-25 VITALS
BODY MASS INDEX: 33.32 KG/M2 | HEART RATE: 80 BPM | RESPIRATION RATE: 18 BRPM | DIASTOLIC BLOOD PRESSURE: 65 MMHG | TEMPERATURE: 97.8 F | WEIGHT: 200 LBS | SYSTOLIC BLOOD PRESSURE: 146 MMHG | OXYGEN SATURATION: 100 % | HEIGHT: 65 IN

## 2017-12-25 PROCEDURE — 74011250637 HC RX REV CODE- 250/637: Performed by: OBSTETRICS & GYNECOLOGY

## 2017-12-25 RX ADMIN — IBUPROFEN 800 MG: 800 TABLET ORAL at 00:13

## 2017-12-25 NOTE — ROUTINE PROCESS
Bedside and Verbal shift change report given to [address] (oncoming nurse) by Oli Whitley RN (offgoing nurse). Report included the following information SBAR, Intake/Output, MAR, Accordion and Recent Results.

## 2017-12-25 NOTE — PROGRESS NOTES
Post-Operative  Progress Note      Information for the patient's :  Paul Cintron, Female [[id number]]   , Low Transverse     Patient doing well without significant complaint. Nausea and vomiting resolved. She is tolerating oral intake. Pain well controlled. Delivery information:  Information for the patient's :  Paul Cintron, Female [[id number]]   Delivery of a 7 lb 15.9 oz (3.625 kg) female infant via , Low Transverse on 2017 at 10:30 AM  by . Apgars were 7 and 8.        Vitals:  Visit Vitals    /65 (BP 1 Location: Right arm, BP Patient Position: At rest)    Pulse 80    Temp 97.8 °F (36.6 °C)    Resp 18    Ht 5' 5\" (1.651 m)    Wt 200 lb (90.7 kg)    LMP 2017 (Exact Date)    SpO2 100%    Breastfeeding Unknown    BMI 33.28 kg/m2     Temp (24hrs), Av °F (36.7 °C), Min:97.8 °F (36.6 °C), Max:98.4 °F (36.9 °C)      Last 24hr Input/Output:  No intake or output data in the 24 hours ending 17 0732     Exam:    Gen: Patient without distress  Lungs: clear to ascultation bilaterally  Cor: S1, S2, regular rate and rhythm  Abdomen: bowel sounds present, soft, appropriate tenderness, fundus firm   Incision: clean, dry and intact  Lower extremities: negative for cords or tenderness, trace edema bilaterally    Labs:   Lab Results   Component Value Date/Time    WBC 13.3 2017 04:21 AM    WBC 11.5 2017 08:09 AM    WBC 12.6 10/12/2017 04:05 PM    WBC 13.4 2017 01:25 PM    HGB 10.5 2017 04:21 AM    HGB 12.0 2017 08:09 AM    HGB 10.8 10/12/2017 04:05 PM    HGB 12.2 2017 01:25 PM    HCT 32.3 2017 04:21 AM    HCT 35.9 2017 08:09 AM    HCT 33.3 10/12/2017 04:05 PM    HCT 36.3 2017 01:25 PM    PLATELET 922  04:21 AM    PLATELET 137  08:09 AM    PLATELET 387  04:05 PM    PLATELET 367  01:25 PM    Hgb, External 12.2 2017    Hct, External 36.3 2017    Platelet cnt., External 363 2017     WBC   Date Value Ref Range Status   2017 13.3 (H) 3.6 - 11.0 K/uL Final     RBC   Date Value Ref Range Status   2017 3.77 (L) 3.80 - 5.20 M/uL Final     HGB   Date Value Ref Range Status   2017 10.5 (L) 11.5 - 16.0 g/dL Final     HCT   Date Value Ref Range Status   2017 32.3 (L) 35.0 - 47.0 % Final     PLATELET   Date Value Ref Range Status   2017 225 150 - 400 K/uL Final     Hgb, External   Date Value Ref Range Status   2017 12.2  Final     Hct, External   Date Value Ref Range Status   2017 36.3  Final     Platelet cnt., External   Date Value Ref Range Status   2017 363  Final       No results found for this or any previous visit (from the past 24 hour(s)). Assessment:   Post-Op , stable  POP anemia, acute intraoperative blood loss, stable      Plan:     1. Routine post-operative care  2. Encouraged out of bed and ambulation  3. Oral pain medications  4. Advance diet  5.  Continue postpartum and  teaching by Kaylie Alcala MD

## 2017-12-25 NOTE — ROUTINE PROCESS
Discharge instructions reviewed with patient to include signs and symptoms to notify MD, take home instructions and follow up appointments. Patient verbalized understanding of all teaching and voiced no concerns at this time. Patient discharged home.

## 2017-12-25 NOTE — DISCHARGE INSTRUCTIONS
POST DELIVERY DISCHARGE INSTRUCTIONS    Name: Maxime Shetty  YOB: 1975  Primary Diagnosis: Active Problems:    Pregnant (2017)        General:     Diet/Diet Restrictions:  Eight 8-ounce glasses of fluid daily (water, juices); avoid excessive caffeine intake. Meals/snacks as desired which are high in fiber and carbohydrates and low in fat and cholesterol. Medications:       Physical Activity / Restrictions / Safety:     Avoid heavy lifting, no more that 8 lbs. For 2-3 weeks; No driving while taking narcotic pain medication. Post  patients should not drive until pain free. No intercourse 4-6 weeks, no douching or tampon use. May resume exercise in 6 weeks. Discharge Instructions/Special Treatment/Home Care Needs:     Continue prenatal vitamins. Continue to use squirt bottle with warm water on your episiotomy after each bathroom use until bleeding stops. If steri-strips applied to your incision, remove in 7 days. Take stool softeners daily. Call your doctor for the following:     Fever over 101 degrees by mouth. Vaginal bleeding heavier than a normal menstrual period or lost larger than a golf ball. Red streaks or increased swelling of legs, painful red streaks on your breast.  Painful urination, or increased pain, redness or discharge with your incision. Pain Management:     Pain Management:   Take Acetaminophen (Tylenol) or Ibuprofen (Advil, Motrin), as directed for pain. Use a warm Sitz bath 3 times daily to relieve episiotomy or hemorrhoidal discomfort. Heating pad to  incision as needed. For hemorrhoidal discomfort, use Tucks and Anusol cream as needed and directed.     Follow-Up Care:     Appointment with MD:   Follow-up Appointments   Procedures    FOLLOW UP VISIT Appointment in: 6 Weeks     Standing Status:   Standing     Number of Occurrences:   1     Order Specific Question:   Appointment in     Answer:   6 Weeks     Telephone number: [phone]    Signed By: Dominick Bermudez MD                                                                                                   Date: 12/22/2017 Time: 10:02 AM

## 2018-02-01 ENCOUNTER — OFFICE VISIT (OUTPATIENT)
Dept: OBGYN CLINIC | Age: 43
End: 2018-02-01

## 2018-02-01 VITALS
DIASTOLIC BLOOD PRESSURE: 84 MMHG | BODY MASS INDEX: 28.32 KG/M2 | WEIGHT: 170 LBS | SYSTOLIC BLOOD PRESSURE: 124 MMHG | HEIGHT: 65 IN

## 2018-02-01 NOTE — MR AVS SNAPSHOT
[facility] [address] [place] [address] 
[phone] Patient: Rashmi Patten MRN: [id number] :1975 Visit Information Date & Time Provider Department Dept. Phone Encounter #  
 2018  1:30 PM MD Juan C Naidu [phone] [id number] Upcoming Health Maintenance Date Due Influenza Age 5 to Adult 2017 PAP AKA CERVICAL CYTOLOGY 2020 Allergies as of 2018  Review Complete On: 2018 By: Tilford Phalen Severity Noted Reaction Type Reactions Bee Sting [Sting, Bee] High 2017    Anaphylaxis Current Immunizations  Reviewed on 2017 Name Date Rho(D) Immune Globulin - IM 2017 11:50 AM, 10/12/2017 Not reviewed this visit Vitals BP Height(growth percentile) Weight(growth percentile) Breastfeeding? BMI OB Status 124/84 5' 5\" (1.651 m) 170 lb (77.1 kg) Yes 28.29 kg/m2 Recent pregnancy Smoking Status Former Smoker BMI and BSA Data Body Mass Index Body Surface Area  
 28.29 kg/m 2 1.88 m 2 Preferred Pharmacy Pharmacy Name Phone [id number] [address] [phone] Your Updated Medication List  
  
   
This list is accurate as of: 18  1:39 PM.  Always use your most recent med list.  
  
  
  
  
 azithromycin 250 mg tablet Commonly known as:  Yuliet Castana 2 tabs po today then 1 tab po daily x 4 days  
  
 oxyCODONE-acetaminophen 5-325 mg per tablet Commonly known as:  PERCOCET Take 1-2 Tabs by mouth every four (4) hours as needed for Pain. Max Daily Amount: 12 Tabs. PNV Comb. No76-Iron,Carbonyl-FA 29 mg iron- 1 mg Tab Commonly known as:  PNV 29-1 Take 1 Tab by mouth daily. Any generic prenatal vitamin will do.  
  
 prenatal vit-iron fumarate-fa 27 mg iron- 0.8 mg Tab tablet Take 1 Tab by mouth daily. Introducing [address]! Dear Elliott [facility]: Thank you for requesting a [employer] account. Our records indicate that you already have an active [employer] account. You can access your account anytime at [email or web address] Did you know that you can access your hospital and ER discharge instructions at any time in [employer]? You can also review all of your test results from your hospital stay or ER visit. Additional Information If you have questions, please visit the Frequently Asked Questions section of the [employer] website at [email or web address] Remember, [employer] is NOT to be used for urgent needs. For medical emergencies, dial 911. Now available from your iPhone and Android! Please provide this summary of care documentation to your next provider. Your primary care clinician is listed as Josh Garcia. If you have any questions after today's visit, please call [phone].

## 2018-02-01 NOTE — PROGRESS NOTES
Postpartum evaluation    Fermín Pineda is a 39 y.o. female who presents for a postpartum exam.     She is now six weeks post primary  section. Her baby is doing well. She has had no menses since delivery. She has had the following significant problems since her delivery: none    The patient is breast and bottle feeding without difficulty. The patient would like to use nothing for birth control. She is currently taking: no medications. She is due for her next AE in 12 months. Visit Vitals    /84    Ht 5' 5\" (1.651 m)    Wt 170 lb (77.1 kg)    Breastfeeding Yes    BMI 28.29 kg/m2       PHYSICAL EXAMINATION    Constitutional  · Appearance: well-nourished, well developed, alert, in no acute distress    HENT  · Head and Face: appears normal    Neck  · Inspection/Palpation: normal appearance, no masses or tenderness  · Lymph Nodes: no lymphadenopathy present  · Thyroid: gland size normal, nontender, no nodules or masses present on palpation    Gastrointestinal  · Abdominal Examination: abdomen non-tender to palpation, normal bowel sounds, no masses present  · Liver and spleen: no hepatomegaly present, spleen not palpable  · Hernias: no hernias identified    Skin  · General Inspection: no rash, no lesions identified    Neurologic/Psychiatric  · Mental Status:  · Orientation: grossly oriented to person, place and time  · Mood and Affect: mood normal, affect appropriate    Assessment:  Normal postpartum check    Plan:  RTO for AE.

## 2022-03-19 PROBLEM — Z34.03 ENCOUNTER FOR SUPERVISION OF NORMAL FIRST PREGNANCY IN THIRD TRIMESTER: Status: ACTIVE | Noted: 2017-06-13

## 2022-03-19 PROBLEM — Z34.90 PREGNANT: Status: ACTIVE | Noted: 2017-12-22

## 2022-03-20 PROBLEM — Z34.90 PREGNANCY: Status: ACTIVE | Noted: 2017-09-17

## 2022-07-11 NOTE — PROGRESS NOTES
Annual exam ages 40-58      Mariluz Daily is a ,  49 y.o. female   No LMP recorded. She presents for her annual checkup. She is having no significant problems. Menstrual status:    Her periods are normal in flow. She is using three to ten pads or tampons per day, usually regular with a 26-32 day interval with 3-7 day duration. She has dysmenorrhea. She reports no premenstrual symptoms. Contraception:    The current method of family planning is none. Hormonal status:  She reports no perimenstrual type symptoms. She is not having vasomotor symptoms. The patient is not using any ERT. Sexual history:    She  reports being sexually active and has had partner(s) who are male. She reports using the following method of birth control/protection: None. Medical conditions:    Since her last annual GYN exam about four years ago, she has not the following changes in her health history: none. Surgical history confirmed with patient. has no past surgical history on file. Pap and Mammogram History:    Her most recent Pap smear was normal, obtained 5 year(s) ago. The patient had her mammogram today in our office. Breast Cancer History/Substance Abuse: negative      Osteoporosis History:    Family history does not include a first or second degree relative with osteopenia or osteoporosis. Past Medical History:   Diagnosis Date    Arrhythmia     congenital heart murmur     No past surgical history on file. Current Outpatient Medications   Medication Sig Dispense Refill    oxyCODONE-acetaminophen (PERCOCET) 5-325 mg per tablet Take 1-2 Tabs by mouth every four (4) hours as needed for Pain. Max Daily Amount: 12 Tabs. 28 Tab 0    prenatal vit-iron fumarate-fa 27 mg iron- 0.8 mg tab tablet Take 1 Tab by mouth daily. 30 Tab 11    azithromycin (ZITHROMAX) 250 mg tablet 2 tabs po today then 1 tab po daily x 4 days 6 Tab 0    PNV Comb. No76-Iron,Carbonyl-FA (PNV 29-1) 29 mg iron- 1 mg tab Take 1 Tab by mouth daily. Any generic prenatal vitamin will do. 90 Tab 4     Allergies: Bee sting [sting, bee]     Tobacco History:  reports that she has quit smoking. She has never used smokeless tobacco.  Alcohol Abuse:  reports no history of alcohol use. Drug Abuse:  reports no history of drug use.     Family Medical/Cancer History:   Family History   Problem Relation Age of Onset    No Known Problems Mother         Review of Systems - History obtained from the patient  Constitutional: negative for weight loss, fever, night sweats  HEENT: negative for hearing loss, earache, congestion, snoring, sorethroat  CV: negative for chest pain, palpitations, edema  Resp: negative for cough, shortness of breath, wheezing  GI: negative for change in bowel habits, abdominal pain, black or bloody stools  : negative for frequency, dysuria, hematuria, vaginal discharge  MSK: negative for back pain, joint pain, muscle pain  Breast: negative for breast lumps, nipple discharge, galactorrhea  Skin :negative for itching, rash, hives  Neuro: negative for dizziness, headache, confusion, weakness  Psych: negative for anxiety, depression, change in mood  Heme/lymph: negative for bleeding, bruising, pallor    Physical Exam  Visit Vitals  /74   Wt 178 lb (80.7 kg)   Breastfeeding No   BMI 29.62 kg/m²       Constitutional  · Appearance: well-nourished, well developed, alert, in no acute distress    HENT  · Head and Face: appears normal    Neck  · Inspection/Palpation: normal appearance, no masses or tenderness  · Lymph Nodes: no lymphadenopathy present  · Thyroid: gland size normal, nontender, no nodules or masses present on palpation    Chest  · Respiratory Effort: breathing unlabored    Breasts  · Inspection of Breasts: breasts symmetrical, no skin changes, no discharge present, nipple appearance normal, no skin retraction present  · Palpation of Breasts and Axillae: no masses present on palpation, no breast tenderness  · Axillary Lymph Nodes: no lymphadenopathy present    Gastrointestinal  · Abdominal Examination: abdomen non-tender to palpation, normal bowel sounds, no masses present  · Liver and spleen: no hepatomegaly present, spleen not palpable  · Hernias: no hernias identified    Genitourinary  · External Genitalia: normal appearance for age, no discharge present, no tenderness present, no inflammatory lesions present, no masses present, no atrophy present  · Vagina: normal vaginal vault without central or paravaginal defects, no discharge present, no inflammatory lesions present, no masses present  · Bladder: non-tender to palpation  · Urethra: appears normal  · Cervix: normal   · Uterus: normal size, shape and consistency  · Adnexa: no adnexal tenderness present, no adnexal masses present  · Perineum: perineum within normal limits, no evidence of trauma, no rashes or skin lesions present  · Anus: anus within normal limits, no hemorrhoids present  · Inguinal Lymph Nodes: no lymphadenopathy present    Skin  · General Inspection: no rash, no lesions identified    Neurologic/Psychiatric  · Mental Status:  · Orientation: grossly oriented to person, place and time  · Mood and Affect: mood normal, affect appropriate    Assessment:  Routine gynecologic examination  Her current medical status is satisfactory with no evidence of significant gynecologic issues.     Plan:  Counseled re: diet, exercise, healthy lifestyle  Return for yearly wellness visits  Rec annual mammogram

## 2022-07-12 ENCOUNTER — OFFICE VISIT (OUTPATIENT)
Dept: OBGYN CLINIC | Age: 47
End: 2022-07-12

## 2022-07-12 VITALS — BODY MASS INDEX: 29.62 KG/M2 | DIASTOLIC BLOOD PRESSURE: 74 MMHG | WEIGHT: 178 LBS | SYSTOLIC BLOOD PRESSURE: 139 MMHG

## 2022-07-12 DIAGNOSIS — Z12.11 SCREENING FOR COLON CANCER: ICD-10-CM

## 2022-07-12 DIAGNOSIS — Z01.419 WELL FEMALE EXAM WITH ROUTINE GYNECOLOGICAL EXAM: Primary | ICD-10-CM

## 2022-07-12 PROCEDURE — 99396 PREV VISIT EST AGE 40-64: CPT | Performed by: OBSTETRICS & GYNECOLOGY

## 2022-07-16 LAB
CYTOLOGIST CVX/VAG CYTO: NORMAL
CYTOLOGY CVX/VAG DOC CYTO: NORMAL
CYTOLOGY CVX/VAG DOC THIN PREP: NORMAL
CYTOLOGY HISTORY:: NORMAL
DX ICD CODE: NORMAL
HPV I/H RISK 4 DNA CVX QL PROBE+SIG AMP: NEGATIVE
Lab: NORMAL
OTHER STN SPEC: NORMAL
STAT OF ADQ CVX/VAG CYTO-IMP: NORMAL

## 2023-02-01 ENCOUNTER — TELEPHONE (OUTPATIENT)
Dept: OBGYN CLINIC | Age: 48
End: 2023-02-01

## 2023-02-01 NOTE — TELEPHONE ENCOUNTER
51year old last seen in the office on 7/12/2022 for ae    Patient calling to say that she is still having her period and is expecting it to come soon     Patient reports having hot flashes and getting cold and is wondering how to proceed    Patient was placed on the schedule to be seen on 2/17/2023 at 2:40PM for evaluation    Patient verbalized understanding.

## 2023-02-17 ENCOUNTER — TELEPHONE (OUTPATIENT)
Dept: OBGYN CLINIC | Age: 48
End: 2023-02-17

## 2023-02-17 ENCOUNTER — OFFICE VISIT (OUTPATIENT)
Dept: OBGYN CLINIC | Age: 48
End: 2023-02-17
Payer: COMMERCIAL

## 2023-02-17 DIAGNOSIS — F32.A DEPRESSION, UNSPECIFIED DEPRESSION TYPE: ICD-10-CM

## 2023-02-17 DIAGNOSIS — N95.1 PERIMENOPAUSAL: Primary | ICD-10-CM

## 2023-02-17 PROBLEM — Z34.03 ENCOUNTER FOR SUPERVISION OF NORMAL FIRST PREGNANCY IN THIRD TRIMESTER: Status: RESOLVED | Noted: 2017-06-13 | Resolved: 2023-02-17

## 2023-02-17 PROBLEM — Z34.90 PREGNANT: Status: RESOLVED | Noted: 2017-12-22 | Resolved: 2023-02-17

## 2023-02-17 PROBLEM — Z34.90 PREGNANCY: Status: RESOLVED | Noted: 2017-09-17 | Resolved: 2023-02-17

## 2023-02-17 RX ORDER — BUPROPION HYDROCHLORIDE 150 MG/1
150 TABLET ORAL DAILY
Qty: 30 TABLET | Refills: 0 | Status: SHIPPED | OUTPATIENT
Start: 2023-02-17

## 2023-02-17 NOTE — PROGRESS NOTES
Haylee York is a 51 y.o. female presents for a problem visit. Chief Complaint   Patient presents with    Fatigue    Excessive Sweating    Crying     Patient's last menstrual period was 02/10/2023. Birth Control: none. Last Pap: normal NO ECC obtained 7/2022    The patient is reporting having: night sweats, fatigue, and increased crying. 1. Have you been to the ER, urgent care clinic, or hospitalized since your last visit? No    2. Have you seen or consulted any other health care providers outside of the [address] [place] [address] since your last visit? No    Examination chaperoned by Abraham Newton CMA.

## 2023-02-17 NOTE — PROGRESS NOTES
GYN Problem Visit Note    Chief Complaint   Fatigue, Excessive Sweating, and Crying   Patient's last menstrual period was 02/10/2023. Leti Blank is a 51 y.o. female who complains of   Chief Complaint   Patient presents with    Fatigue    Excessive Sweating    Crying     Patient reports she continues to have monthly cycles. However she has noticed increased crying increased moodiness increase frustration. She denies suicidal ideations or homicidal ideations she denies hallucinations. She reports she also has hot flashes at times but also sometimes feels chills. Per Nursing Note:  Patient presents with    Fatigue    Excessive Sweating    Crying      Patient's last menstrual period was 02/10/2023. Birth Control: none. Last Pap: normal NO ECC obtained 7/2022     The patient is reporting having: night sweats, fatigue, and increased crying. Past Medical History:   Diagnosis Date    Arrhythmia     congenital heart murmur     No past surgical history on file. Social History     Occupational History    Not on file   Tobacco Use    Smoking status: Former    Smokeless tobacco: Never   Substance and Sexual Activity    Alcohol use: No    Drug use: No    Sexual activity: Yes     Partners: Male     Birth control/protection: None     Family History   Problem Relation Age of Onset    No Known Problems Mother        Allergies   Allergen Reactions    Bee Sting [Sting, Bee] Anaphylaxis     Prior to Admission medications    Medication Sig Start Date End Date Taking? Authorizing Provider   buPROPion XL (WELLBUTRIN XL) 150 mg tablet Take 1 Tablet by mouth daily. 2/17/23  Yes Jessica Santana MD   oxyCODONE-acetaminophen (PERCOCET) 5-325 mg per tablet Take 1-2 Tabs by mouth every four (4) hours as needed for Pain. Max Daily Amount: 12 Tabs. Patient not taking: No sig reported 12/22/17   Jessica Santana MD   prenatal vit-iron fumarate-fa 27 mg iron- 0.8 mg tab tablet Take 1 Tab by mouth daily.   Patient not taking: No sig reported 9/22/17   Nicole Maya MD   azithromycin (ZITHROMAX) 250 mg tablet 2 tabs po today then 1 tab po daily x 4 days  Patient not taking: No sig reported 9/6/17   Nicole Maya MD   PNV Comb. No76-Iron,Carbonyl-FA (PNV 29-1) 29 mg iron- 1 mg tab Take 1 Tab by mouth daily. Any generic prenatal vitamin will do. Patient not taking: No sig reported 8/15/17   Nicole Maya MD        Review of Systems: History obtained from the patient  Constitutional: negative for weight loss, fever, night sweats  Breast: negative for breast lumps, nipple discharge, galactorrhea  GI: negative for change in bowel habits, abdominal pain, black or bloody stools  : negative for frequency, dysuria, hematuria, vaginal discharge  MSK: negative for back pain, joint pain, muscle pain  Skin: negative for itching, rash, hives  Psych: negative for anxiety, depression, change in mood      Objective:  Visit Vitals  LMP 02/10/2023       Physical Exam:   PHYSICAL EXAMINATION    Constitutional  Appearance: well-nourished, well developed, alert, in no acute distress    Skin  General Inspection: no rash, no lesions identified    Neurologic/Psychiatric  Mental Status:  Orientation: grossly oriented to person, place and time  Mood and Affect: mood normal, affect appropriate      ASSESSMENT/PLAN:  Perimenopause- discussed options, pt not interested in medical management at this time. Depression- will start Wel[place]in, if no improvement will check tsh at next appt, will fu in 1 month    Orders Placed This Encounter    buPROPion XL (WELLBUTRIN XL) 150 mg tablet     Sig: Take 1 Tablet by mouth daily. Dispense:  30 Tablet     Refill:  0     RTO prn if symptoms persist or worsen. Instructions given to pt. Handouts given to pt.

## 2023-02-21 ENCOUNTER — TELEPHONE (OUTPATIENT)
Dept: OBGYN CLINIC | Age: 48
End: 2023-02-21

## 2023-02-21 NOTE — TELEPHONE ENCOUNTER
Patient called back and was advised regarding the medication and does not feel depressed but thinks her symptoms are related to hormonal changes    Patient advised that she does not have to take the medication . Patient would like to have appointment to check hormone levels      ?  Ok to set up lab appointment only    Please advise    Thank you

## 2023-02-21 NOTE — TELEPHONE ENCOUNTER
This nurse attempted to reach back out to the patient and left a detailed message regarding MD progress notes and why she was given the medication

## 2023-02-21 NOTE — TELEPHONE ENCOUNTER
This nurse attempted to reach the patient and left a detailed message regarding MD recommendations and to call the office back in the am to set up appointment for lab only to check on her thyroid function

## 2023-02-21 NOTE — TELEPHONE ENCOUNTER
51year old patient last seen in the office 2/17/2023     Patient calling to say that she was given a prescription for buPROPion XL (WELLBUTRIN XL) 150 mg tablet  And she states she is not depressed    Patient was advised that it can be used to address menopausal symptoms     Patient wondering why a side effect is to cause seizures    Patient was advised to check with the pharmacist    Patient verbalized understanding.

## 2023-02-21 NOTE — TELEPHONE ENCOUNTER
She continues to have monthly cycles so her \"hormones\" are still normal (FSH/LH/Estradiol) and do not need to be checked. She is welcome to schedule a lab appointment to have her thryroid checked.

## 2023-02-27 NOTE — TELEPHONE ENCOUNTER
Pt called and said meds prescribed today in the office had not been sent to her pharmacy.  I attempted to call the pt back 3 times to confirm her pharmacy and her phone number was \"unavailable\" and her voicemail was full [Lower back] : lower back [10] : 10 [5] : 5 [Radiating] : radiating [Constant] : constant [Household chores] : household chores [Leisure] : leisure [Sleep] : sleep [Social interactions] : social interactions [Injection therapy] : injection therapy [Standing] : standing [de-identified] : pt is following up for b/l SIJ pain , pain is going around the back  [] : no [FreeTextEntry6] : stiffness [de-identified] : flat on back

## 2023-03-20 RX ORDER — BUPROPION HYDROCHLORIDE 150 MG/1
TABLET ORAL
Qty: 30 TABLET | Refills: 0 | OUTPATIENT
Start: 2023-03-20

## 2023-03-20 RX ORDER — BUPROPION HYDROCHLORIDE 150 MG/1
150 TABLET ORAL DAILY
Qty: 90 TABLET | Refills: 4 | Status: SHIPPED | OUTPATIENT
Start: 2023-03-20

## 2023-05-21 RX ORDER — AZITHROMYCIN 250 MG/1
TABLET, FILM COATED ORAL
COMMUNITY
Start: 2017-09-06

## 2023-05-21 RX ORDER — OXYCODONE HYDROCHLORIDE AND ACETAMINOPHEN 5; 325 MG/1; MG/1
1-2 TABLET ORAL EVERY 4 HOURS PRN
COMMUNITY
Start: 2017-12-22

## 2023-05-21 RX ORDER — BUPROPION HYDROCHLORIDE 150 MG/1
150 TABLET ORAL DAILY
COMMUNITY
Start: 2023-03-20

## 2023-07-11 ENCOUNTER — TELEPHONE (OUTPATIENT)
Age: 48
End: 2023-07-11

## 2023-07-11 NOTE — TELEPHONE ENCOUNTER
Can keep appt for mammogram and annual in septeber but should be seen for bleeding sooner with ultrasound. Please schedule next available appt with ultrasound.

## 2023-07-11 NOTE — TELEPHONE ENCOUNTER
David Jaramillo pt calling to report irregular cycles coming more frequent & increased cramping/abdominal pain. Last month pt had 2 cycles. The flow is medium/light. Pt reports cramping. Pt reports a bad episode of cramping where it was from her back radiating to her stomach, she reports the feeling was similar to when she had a miscarriage in the past. Pt reports no concerns for pregnancy, pt is not SA. Pt reports last time she saw Dr. David Jaramillo she was told she would be in menopause soon, last seen 2/17/23 for menopausal sx. Last AE was 7/12/22. Pt reports having hot flashes/feeling hot then feeling cold other times. Pt reports mood changes, feeling more emotional, & crying more as well. Pt missed a cycle in 4/2023. First cycle she has skipped. Pt reports a hx of fibroids & has not had an ultrasound since her pregnancy. Pt reports a left side abdominal pain that gets worse right before she begins bleeding. Pt was scheduled for ae & mammogram for next aval date to have both appts on same day at or around 3pm. Scheduled for 9/27/23 at 240pm mammo & 3pm with Dr. David Jaramillo. Pt wondering if she needs to be seen sooner for the bleeding. Pt reports her maternal aunt passed from ovarian or cervical cancer & her maternal aunts daughter also passed from cervical or ovarian cancer.

## 2023-07-12 NOTE — TELEPHONE ENCOUNTER
CARLEYM on mobile asking pt to review [facility] message I will send & call back prn    Scheduled appt for us & fu with Dr. Danya Caballero

## 2023-07-18 ENCOUNTER — TELEPHONE (OUTPATIENT)
Age: 48
End: 2023-07-18

## 2023-07-18 NOTE — TELEPHONE ENCOUNTER
Patient calling back to say that she was advised per insurance that does not have to pay co pay but MD needs to do something    This nurse is not able to answer the question and has transferred to billing for further assistance

## 2023-07-18 NOTE — TELEPHONE ENCOUNTER
Two patient identifiers used      51year old patient last seen in the office on 7/12/2022 and has next ae on 9/26/2023    Patient has appointment tomorrow for ultrasound and ov    Patient calling to ask billing questions related her her upcoming office visit and was advised to check with her insurance company about what the co pay would be    Patient verbalized understanding.

## 2023-07-18 NOTE — PROGRESS NOTES
Iván Ingram is a 51 y.o. female presents for a problem visit. Chief Complaint   Patient presents with    Irregular Menses    Pelvic Pain    Ultrasound     No LMP recorded. Birth Control: none. Last Pap: normal NO ECC obtained 1 year(s) ago. The patient is reporting having: irregular menses. She also c/o pelvic pain, cramping, and hot flashes. Ultrasound today showed:    TRANSVAGINAL ULTRASOUND PERFORMED  UTERUS IS ANTEVERTED, ENLARGED IN SIZE AND HETEROGENEOUS IN ECHOGENICITY. A POSTERIOR PEDUNCULATED FIBROID IS SEEN AND MEASURED.  ENDOMETRIUM MEASURES 7-8MM IN THICKNESS. NO EVIDENCE OF MASSES OR ABNORMALITIES ARE SEEN. RIGHT OVARY APPEARS WITHIN NORMAL LIMITS. THERE APPEARS TO BE A FOLLICULAR CYST. LEFT OVARY APPEARS WITHIN NORMAL LIMITS. NO FREE FLUID SEEN IN THE CDS. 1. Have you been to the ER, urgent care clinic, or hospitalized since your last visit? No    2. Have you seen or consulted any other health care providers outside of the [address] [place] [address] Avenue since your last visit? No    Examination chaperoned by Pedro Meehan CMA.       Chart reviewed for the following:   Pedro BRADY CMA, have reviewed the History, Physical and updated the Allergic reactions for [address] [place] [address] performed immediately prior to start of procedure:   Pedro BRADY CMA, have performed the following reviews on Iván Ingram prior to the start of the procedure:            * Patient was identified by name and date of birth   * Agreement on procedure being performed was verified  * Risks and Benefits explained to the patient  * Procedure site verified and marked as necessary  * Patient was positioned for comfort  * Consent was signed and verified     Time: 915am    Date of procedure: 7/19/2023    Procedure performed by:  Denice Nunes MD       Provider assisted by: Florrie Peabody, [address]     Patient assisted by: self    How tolerated by patient: tolerated the procedure well with no

## 2023-07-19 ENCOUNTER — OFFICE VISIT (OUTPATIENT)
Age: 48
End: 2023-07-19

## 2023-07-19 VITALS — DIASTOLIC BLOOD PRESSURE: 93 MMHG | SYSTOLIC BLOOD PRESSURE: 147 MMHG | WEIGHT: 183 LBS

## 2023-07-19 DIAGNOSIS — N93.9 ABNORMAL UTERINE BLEEDING (AUB): Primary | ICD-10-CM

## 2023-07-19 NOTE — PROGRESS NOTES
GYN Problem Visit Note    Chief Complaint   Irregular Menses, Pelvic Pain, and Ultrasound   No LMP recorded. Cata Jerome is a 51 y.o. female who complains of   Chief Complaint   Patient presents with    Irregular Menses    Pelvic Pain    Ultrasound       Last 2 periods were heavy. Had 2 periods last month. Both very painful. Per Nursing Note:  No LMP recorded. Birth Control: none. Last Pap: normal NO ECC obtained 1 year(s) ago. The patient is reporting having: irregular menses. She also c/o pelvic pain, cramping, and hot flashes. Ultrasound today showed:     TRANSVAGINAL ULTRASOUND PERFORMED  UTERUS IS ANTEVERTED, ENLARGED IN SIZE AND HETEROGENEOUS IN ECHOGENICITY. A POSTERIOR PEDUNCULATED FIBROID IS SEEN AND MEASURED.  ENDOMETRIUM MEASURES 7-8MM IN THICKNESS. NO EVIDENCE OF MASSES OR ABNORMALITIES ARE SEEN. RIGHT OVARY APPEARS WITHIN NORMAL LIMITS. THERE APPEARS TO BE A FOLLICULAR CYST. LEFT OVARY APPEARS WITHIN NORMAL LIMITS. NO FREE FLUID SEEN IN THE CDS. Past Medical History:   Diagnosis Date    Arrhythmia     congenital heart murmur     No past surgical history on file. Social History     Occupational History    Not on file   Tobacco Use    Smoking status: Former    Smokeless tobacco: Never   Substance and Sexual Activity    Alcohol use: No    Drug use: No    Sexual activity: Not on file     Family History   Problem Relation Age of Onset    No Known Problems Mother     Other Cancer Maternal Aunt         either cervical or ovarian    Other Cancer Maternal Cousin         either cervical or ovarian       Allergies   Allergen Reactions    Bee Venom Anaphylaxis     Prior to Admission medications    Medication Sig Start Date End Date Taking?  Authorizing Provider   azithromycin (ZITHROMAX) 250 MG tablet 2 tabs po today then 1 tab po daily x 4 days  Patient not taking: Reported on 7/19/2023 9/6/17   Ar Automatic Reconciliation   buPROPion (WELLBUTRIN XL) 150 MG extended release

## 2023-07-24 LAB
CPT BILLING CODE: NORMAL
DIAGNOSIS SYNOPSIS:: NORMAL
DX ICD CODE: NORMAL
PATH REPORT.FINAL DX SPEC: NORMAL
PATH REPORT.GROSS SPEC: NORMAL
PATH REPORT.RELEVANT HX SPEC: NORMAL
PATH REPORT.SITE OF ORIGIN SPEC: NORMAL
PATHOLOGIST NAME: NORMAL
PAYMENT PROCEDURE: NORMAL

## 2024-03-11 ENCOUNTER — OFFICE VISIT (OUTPATIENT)
Age: 49
End: 2024-03-11
Payer: COMMERCIAL

## 2024-03-11 VITALS
SYSTOLIC BLOOD PRESSURE: 151 MMHG | WEIGHT: 184 LBS | HEIGHT: 64 IN | BODY MASS INDEX: 31.41 KG/M2 | DIASTOLIC BLOOD PRESSURE: 74 MMHG

## 2024-03-11 DIAGNOSIS — Z01.419 ENCOUNTER FOR GYNECOLOGICAL EXAMINATION (GENERAL) (ROUTINE) WITHOUT ABNORMAL FINDINGS: Primary | ICD-10-CM

## 2024-03-11 DIAGNOSIS — N89.8 VAGINAL ODOR: ICD-10-CM

## 2024-03-11 PROCEDURE — 99396 PREV VISIT EST AGE 40-64: CPT | Performed by: OBSTETRICS & GYNECOLOGY

## 2024-03-11 SDOH — ECONOMIC STABILITY: INCOME INSECURITY: HOW HARD IS IT FOR YOU TO PAY FOR THE VERY BASICS LIKE FOOD, HOUSING, MEDICAL CARE, AND HEATING?: NOT HARD AT ALL

## 2024-03-11 SDOH — ECONOMIC STABILITY: HOUSING INSECURITY
IN THE LAST 12 MONTHS, WAS THERE A TIME WHEN YOU DID NOT HAVE A STEADY PLACE TO SLEEP OR SLEPT IN A SHELTER (INCLUDING NOW)?: NO

## 2024-03-11 SDOH — ECONOMIC STABILITY: FOOD INSECURITY: WITHIN THE PAST 12 MONTHS, YOU WORRIED THAT YOUR FOOD WOULD RUN OUT BEFORE YOU GOT MONEY TO BUY MORE.: NEVER TRUE

## 2024-03-11 SDOH — ECONOMIC STABILITY: FOOD INSECURITY: WITHIN THE PAST 12 MONTHS, THE FOOD YOU BOUGHT JUST DIDN'T LAST AND YOU DIDN'T HAVE MONEY TO GET MORE.: NEVER TRUE

## 2024-03-11 ASSESSMENT — PATIENT HEALTH QUESTIONNAIRE - PHQ9
2. FEELING DOWN, DEPRESSED OR HOPELESS: 0
SUM OF ALL RESPONSES TO PHQ QUESTIONS 1-9: 0
SUM OF ALL RESPONSES TO PHQ9 QUESTIONS 1 & 2: 0
SUM OF ALL RESPONSES TO PHQ QUESTIONS 1-9: 0
1. LITTLE INTEREST OR PLEASURE IN DOING THINGS: 0
SUM OF ALL RESPONSES TO PHQ QUESTIONS 1-9: 0
SUM OF ALL RESPONSES TO PHQ QUESTIONS 1-9: 0

## 2024-03-11 NOTE — PROGRESS NOTES
Cecy Echeverria is a 49 y.o. female returns for an annual exam     Chief Complaint   Patient presents with    Annual Exam       Patient's last menstrual period was 02/19/2024 (approximate).  Her periods are moderate in flow and usually regular with a 26-32 day interval with 3-7 day duration.  She does not have dysmenorrhea.  Problems: problems - vaginal odor  Birth Control: none.  Last Pap: normal NO ECC obtained 2 year(s) ago.  She does not have a history of ALLISON 2, 3 or cervical cancer.   Last Mammogram: had her mammogram today in our office.  It was see report.   Last colonoscopy: cologuard negative 7/2022      1. Have you been to the ER, urgent care clinic, or hospitalized since your last visit? No    2. Have you seen or consulted any other health care providers outside of the [facility] since your last visit? No    Examination chaperoned by Ricky Sullivan CMA.  
sounds, no masses present  Liver and spleen: no hepatomegaly present, spleen not palpable  Hernias: no hernias identified    Genitourinary  External Genitalia: normal appearance for age, no discharge present, no tenderness present, no inflammatory lesions present, no masses present, no atrophy present  Vagina: normal vaginal vault without central or paravaginal defects, no discharge present, no inflammatory lesions present, no masses present  Bladder: non-tender to palpation  Urethra: appears normal  Cervix: normal   Uterus: normal size, shape and consistency  Adnexa: no adnexal tenderness present, no adnexal masses present  Perineum: perineum within normal limits, no evidence of trauma, no rashes or skin lesions present  Anus: anus within normal limits, no hemorrhoids present  Inguinal Lymph Nodes: no lymphadenopathy present    Skin  General Inspection: no rash, no lesions identified    Neurologic/Psychiatric  Mental Status:  Orientation: grossly oriented to person, place and time  Mood and Affect: mood normal, affect appropriate    Assessment:  Routine gynecologic examination  Her current medical status is satisfactory with no evidence of significant gynecologic issues.  Perimenopause- if she desires medical management will notify and rto for discussed, none needed at this time.  Odor- will fu with nuswab    Plan:  Counseled re: diet, exercise, healthy lifestyle  Return for yearly wellness visits  Pt counseled regarding co-testing for high risk HPV with pap  Rec yearly mammo after age 40  Colonoscopy q10 years or cologuard q3 years after age 45.       Please note that this dictation was completed with [employer], the [employer] voice recognition software.  Quite often unanticipated grammatical, syntax, homophones, and other interpretive errors are inadvertently transcribed by the computer software.  Please disregard these errors.  Please excuse any errors that have escaped final proofreading.

## 2024-03-14 LAB
A VAGINAE DNA VAG QL NAA+PROBE: NORMAL SCORE
BVAB2 DNA VAG QL NAA+PROBE: NORMAL SCORE
C ALBICANS DNA VAG QL NAA+PROBE: NEGATIVE
C GLABRATA DNA VAG QL NAA+PROBE: NEGATIVE
MEGA1 DNA VAG QL NAA+PROBE: NORMAL SCORE
T VAGINALIS DNA VAG QL NAA+PROBE: NEGATIVE

## 2024-03-19 LAB
CYTOLOGIST CVX/VAG CYTO: NORMAL
CYTOLOGY CVX/VAG DOC CYTO: NORMAL
CYTOLOGY CVX/VAG DOC THIN PREP: NORMAL
DX ICD CODE: NORMAL
HPV GENOTYPE REFLEX: NORMAL
HPV I/H RISK 4 DNA CVX QL PROBE+SIG AMP: NEGATIVE
Lab: NORMAL
Lab: NORMAL
OTHER STN SPEC: NORMAL
STAT OF ADQ CVX/VAG CYTO-IMP: NORMAL

## 2025-01-22 ENCOUNTER — TELEPHONE (OUTPATIENT)
Age: 50
End: 2025-01-22

## 2025-01-22 NOTE — TELEPHONE ENCOUNTER
Two patient identifiers used      50 year old patient last seen in the office on 3/11/2024 and has next ae on 3/12/2025 including mammogram.    Patient calling to report irregular periods, skipping one month and sometimes two.  Patient reports she has not been sexually active with any one.    Patient reports changing her bath soap.    Patient reports vaginal discharge, odor and irritation.    Patient was placed on the schedule to be seen on 1/24/2025 at 10:30 am    Patient verbalized understanding.

## 2025-01-23 NOTE — PROGRESS NOTES
Cecy Echeverria is a 50 y.o. female presents for a problem visit.    Chief Complaint   Patient presents with    irregular cycles     Vaginitis     Patient's last menstrual period was 01/04/2025 (approximate).    The patient is reporting having:  irregular cycles  for 2-3 months.  The patient states she would go one to two months w/o a cycle.  The patient also has c/o vaginal discharge, odor and irritation.   The patient wants to r/o any infection. Currently not sexually active.        Examination chaperoned by Mary Manzano MA.

## 2025-01-24 ENCOUNTER — OFFICE VISIT (OUTPATIENT)
Age: 50
End: 2025-01-24
Payer: COMMERCIAL

## 2025-01-24 VITALS — WEIGHT: 186 LBS | SYSTOLIC BLOOD PRESSURE: 151 MMHG | DIASTOLIC BLOOD PRESSURE: 65 MMHG | BODY MASS INDEX: 31.93 KG/M2

## 2025-01-24 DIAGNOSIS — N93.8 DUB (DYSFUNCTIONAL UTERINE BLEEDING): ICD-10-CM

## 2025-01-24 DIAGNOSIS — R82.90 ABNORMAL URINE ODOR: ICD-10-CM

## 2025-01-24 DIAGNOSIS — N89.8 VAGINAL DISCHARGE: Primary | ICD-10-CM

## 2025-01-24 LAB
BILIRUBIN, URINE, POC: NEGATIVE
BLOOD URINE, POC: NORMAL
GLUCOSE URINE, POC: NEGATIVE
KETONES, URINE, POC: NEGATIVE
LEUKOCYTE ESTERASE, URINE, POC: NEGATIVE
NITRITE, URINE, POC: NEGATIVE
PH, URINE, POC: 6 (ref 4.6–8)
PROTEIN,URINE, POC: NORMAL
SPECIFIC GRAVITY, URINE, POC: 1.02 (ref 1–1.03)
URINALYSIS CLARITY, POC: NORMAL
URINALYSIS COLOR, POC: YELLOW
UROBILINOGEN, POC: NORMAL

## 2025-01-24 PROCEDURE — 99213 OFFICE O/P EST LOW 20 MIN: CPT | Performed by: OBSTETRICS & GYNECOLOGY

## 2025-01-24 PROCEDURE — 81003 URINALYSIS AUTO W/O SCOPE: CPT | Performed by: OBSTETRICS & GYNECOLOGY

## 2025-01-24 NOTE — PROGRESS NOTES
GYN Problem Visit Note    Chief Complaint   No chief complaint on file.   No LMP recorded..      HPI  Cecy Echeverria is a 50 y.o. female who complains of No chief complaint on file.      She reports vaginal discharge .  She denies itching, irritation, or odor.  She did recently use a different soap in the shower and noticed issues with it.  She has also noticed that she has started skipping her period for months at a time.    Per Nursing Note:  The patient is reporting having:  irregular cycles  for 2-3 months.  The patient states she would go one to two months w/o a cycle.  The patient also has c/o vaginal discharge, odor and irritation.   The patient wants to r/o any infection. Currently not sexually active.    Past Medical History:   Diagnosis Date    Arrhythmia     congenital heart murmur     Past Surgical History:   Procedure Laterality Date     SECTION  2017     Social History     Occupational History    Not on file   Tobacco Use    Smoking status: Former     Current packs/day: 3.00     Average packs/day: 3.0 packs/day for 15.0 years (45.0 ttl pk-yrs)     Types: Cigarettes    Smokeless tobacco: Never   Substance and Sexual Activity    Alcohol use: No    Drug use: No    Sexual activity: Not Currently     Birth control/protection: None     Family History   Problem Relation Age of Onset    No Known Problems Mother     Other Cancer Maternal Aunt         either cervical or ovarian    Other Cancer Maternal Cousin         either cervical or ovarian       Allergies   Allergen Reactions    Bee Venom Anaphylaxis     Prior to Admission medications    Medication Sig Start Date End Date Taking? Authorizing Provider   azithromycin (ZITHROMAX) 250 MG tablet 2 tabs po today then 1 tab po daily x 4 days  Patient not taking: Reported on 2023   Automatic Reconciliation, Ar   buPROPion (WELLBUTRIN XL) 150 MG extended release tablet Take 1 tablet by mouth daily  Patient not taking: Reported on 2023

## 2025-01-25 LAB
BACTERIA SPEC CULT: NORMAL
SERVICE CMNT-IMP: NORMAL

## 2025-01-29 LAB
A VAGINAE DNA VAG QL NAA+PROBE: NORMAL SCORE
BVAB2 DNA VAG QL NAA+PROBE: NORMAL SCORE
C ALBICANS DNA VAG QL NAA+PROBE: NEGATIVE
C GLABRATA DNA VAG QL NAA+PROBE: NEGATIVE
MEGA1 DNA VAG QL NAA+PROBE: NORMAL SCORE
SPECIMEN STATUS REPORT: NORMAL
T VAGINALIS DNA VAG QL NAA+PROBE: NEGATIVE

## 2025-08-20 ENCOUNTER — OFFICE VISIT (OUTPATIENT)
Age: 50
End: 2025-08-20
Payer: COMMERCIAL

## 2025-08-20 VITALS
DIASTOLIC BLOOD PRESSURE: 82 MMHG | RESPIRATION RATE: 18 BRPM | HEART RATE: 76 BPM | WEIGHT: 190 LBS | BODY MASS INDEX: 32.44 KG/M2 | HEIGHT: 64 IN | SYSTOLIC BLOOD PRESSURE: 136 MMHG

## 2025-08-20 DIAGNOSIS — Z01.419 ENCOUNTER FOR WELL WOMAN EXAM WITH ROUTINE GYNECOLOGICAL EXAM: ICD-10-CM

## 2025-08-20 DIAGNOSIS — Z12.11 COLON CANCER SCREENING: ICD-10-CM

## 2025-08-20 DIAGNOSIS — N89.8 VAGINAL DISCHARGE: Primary | ICD-10-CM

## 2025-08-20 PROCEDURE — 99396 PREV VISIT EST AGE 40-64: CPT | Performed by: OBSTETRICS & GYNECOLOGY

## 2025-08-20 PROCEDURE — 99459 PELVIC EXAMINATION: CPT | Performed by: OBSTETRICS & GYNECOLOGY

## 2025-08-20 SDOH — ECONOMIC STABILITY: FOOD INSECURITY: WITHIN THE PAST 12 MONTHS, THE FOOD YOU BOUGHT JUST DIDN'T LAST AND YOU DIDN'T HAVE MONEY TO GET MORE.: NEVER TRUE

## 2025-08-20 SDOH — ECONOMIC STABILITY: FOOD INSECURITY: WITHIN THE PAST 12 MONTHS, YOU WORRIED THAT YOUR FOOD WOULD RUN OUT BEFORE YOU GOT MONEY TO BUY MORE.: NEVER TRUE

## 2025-08-20 ASSESSMENT — PATIENT HEALTH QUESTIONNAIRE - PHQ9
SUM OF ALL RESPONSES TO PHQ QUESTIONS 1-9: 0
2. FEELING DOWN, DEPRESSED OR HOPELESS: NOT AT ALL
1. LITTLE INTEREST OR PLEASURE IN DOING THINGS: NOT AT ALL
SUM OF ALL RESPONSES TO PHQ QUESTIONS 1-9: 0

## 2025-08-23 LAB
A VAGINAE DNA VAG QL NAA+PROBE: NORMAL SCORE
BVAB2 DNA VAG QL NAA+PROBE: NORMAL SCORE
C ALBICANS DNA VAG QL NAA+PROBE: NEGATIVE
C GLABRATA DNA VAG QL NAA+PROBE: NEGATIVE
MEGA1 DNA VAG QL NAA+PROBE: NORMAL SCORE
SPECIMEN STATUS REPORT: NORMAL

## 2025-08-29 LAB
CYTOLOGIST CVX/VAG CYTO: ABNORMAL
CYTOLOGY CVX/VAG DOC CYTO: ABNORMAL
CYTOLOGY CVX/VAG DOC THIN PREP: ABNORMAL
DX ICD CODE: ABNORMAL
DX ICD CODE: ABNORMAL
HPV GENOTYPE REFLEX: ABNORMAL
HPV I/H RISK 4 DNA CVX QL PROBE+SIG AMP: NEGATIVE
OTHER STN SPEC: ABNORMAL
PATHOLOGIST CVX/VAG CYTO: ABNORMAL
SERVICE CMNT-IMP: ABNORMAL
STAT OF ADQ CVX/VAG CYTO-IMP: ABNORMAL

## 2025-09-02 LAB — NONINV COLON CA DNA+OCC BLD SCRN STL QL: NEGATIVE

## (undated) DEVICE — SUTURE VCRL SZ 4-0 L27IN ABSRB UD L24MM FS-1 3/8 CIR REV J441H

## (undated) DEVICE — (D)STRIP SKN CLSR 0.5X4IN WHT --

## (undated) DEVICE — TRAY CATH OD16FR SIL URIN M STATLOK STBL DEV SURSTP

## (undated) DEVICE — (D)PREP SKN CHLRAPRP APPL 26ML -- CONVERT TO ITEM 371833

## (undated) DEVICE — SUTURE ABSRB BRAID COAT UD CT NO 1 36IN VCRL J959H

## (undated) DEVICE — SUTURE CHROMIC GUT SZ 2-0 L36IN ABSRB BRN L36MM CT-1 1/2 923H

## (undated) DEVICE — C-SECTION II-LF: Brand: MEDLINE INDUSTRIES, INC.

## (undated) DEVICE — SUTURE VCRL SZ 2-0 L36IN ABSRB UD L36MM CT-1 1/2 CIR J945H

## (undated) DEVICE — SOLIDIFIER FLUID 3000 CC ABSORB

## (undated) DEVICE — ABDOMINAL PAD: Brand: DERMACEA

## (undated) DEVICE — ROCKER SWITCH PENCIL HOLSTER: Brand: VALLEYLAB

## (undated) DEVICE — MASTISOL ADHESIVE LIQ 2/3ML

## (undated) DEVICE — TOWEL,OR,DSP,ST,BLUE,STD,2/PK,40PK/CS: Brand: MEDLINE

## (undated) DEVICE — 3000CC GUARDIAN II: Brand: GUARDIAN

## (undated) DEVICE — REM POLYHESIVE ADULT PATIENT RETURN ELECTRODE: Brand: VALLEYLAB

## (undated) DEVICE — SUTURE VCRL SZ 0 L36IN ABSRB VLT L40MM CT 1/2 CIR J358H

## (undated) DEVICE — HANDLE LT SNAP ON ULT DURABLE LENS FOR TRUMPF ALC DISPOSABLE

## (undated) DEVICE — GOWN,AURORA,FABRIC-REINFORCED,X-LARGE: Brand: MEDLINE

## (undated) DEVICE — STERILE LATEX POWDER-FREE SURGICAL GLOVESWITH NITRILE COATING: Brand: PROTEXIS

## (undated) DEVICE — STAPLER SKIN H3.9MM WIRE DIA0.58MM CRWN 6.9MM 35 STPL ROT

## (undated) DEVICE — BULB SYRINGE, IRRIGATION WITH PROTECTIVE CAP, 60 CC, INDIVIDUALLY WRAPPED: Brand: DOVER

## (undated) DEVICE — SLEEVE COMPR STD 12 IN FOR 165IN CALF COMFORT VENODYNE SYS

## (undated) DEVICE — STERILE POLYISOPRENE POWDER-FREE SURGICAL GLOVES: Brand: PROTEXIS

## (undated) DEVICE — SUTURE PLN GUT SZ 2-0 L27IN ABSRB YELLOWISH TAN L40MM CT 853H

## (undated) DEVICE — SOLUTION IV 1000ML 0.9% SOD CHL

## (undated) DEVICE — TIP CLEANER: Brand: VALLEYLAB

## (undated) DEVICE — BLADE SURG UNIV BLUE --